# Patient Record
Sex: FEMALE | Race: BLACK OR AFRICAN AMERICAN | Employment: UNEMPLOYED | URBAN - METROPOLITAN AREA
[De-identification: names, ages, dates, MRNs, and addresses within clinical notes are randomized per-mention and may not be internally consistent; named-entity substitution may affect disease eponyms.]

---

## 2017-06-10 ENCOUNTER — APPOINTMENT (OUTPATIENT)
Dept: GENERAL RADIOLOGY | Age: 57
DRG: 281 | End: 2017-06-10
Attending: EMERGENCY MEDICINE
Payer: MEDICARE

## 2017-06-10 ENCOUNTER — HOSPITAL ENCOUNTER (INPATIENT)
Age: 57
LOS: 4 days | Discharge: ANOTHER HOSPITAL | DRG: 281 | End: 2017-06-14
Attending: EMERGENCY MEDICINE | Admitting: INTERNAL MEDICINE
Payer: MEDICARE

## 2017-06-10 DIAGNOSIS — I20.9 ANGINA PECTORIS (HCC): Primary | ICD-10-CM

## 2017-06-10 PROBLEM — I20.8 ANGINA AT REST (HCC): Status: ACTIVE | Noted: 2017-06-10

## 2017-06-10 LAB
ANION GAP BLD CALC-SCNC: 10 MMOL/L (ref 3–18)
APTT PPP: 33.7 SEC (ref 23–36.4)
APTT PPP: 35.6 SEC (ref 23–36.4)
APTT PPP: 68.8 SEC (ref 23–36.4)
APTT PPP: 80 SEC (ref 23–36.4)
BASOPHILS # BLD AUTO: 0 K/UL (ref 0–0.1)
BASOPHILS # BLD: 0 % (ref 0–2)
BUN SERPL-MCNC: 18 MG/DL (ref 7–18)
BUN/CREAT SERPL: 27 (ref 12–20)
CALCIUM SERPL-MCNC: 9 MG/DL (ref 8.5–10.1)
CHLORIDE SERPL-SCNC: 102 MMOL/L (ref 100–108)
CK MB CFR SERPL CALC: 0.8 % (ref 0–4)
CK MB CFR SERPL CALC: 6.2 % (ref 0–4)
CK MB CFR SERPL CALC: 7.1 % (ref 0–4)
CK MB SERPL-MCNC: 2.1 NG/ML (ref 5–25)
CK MB SERPL-MCNC: 23 NG/ML (ref 5–25)
CK MB SERPL-MCNC: 31 NG/ML (ref 5–25)
CK SERPL-CCNC: 262 U/L (ref 26–192)
CK SERPL-CCNC: 371 U/L (ref 26–192)
CK SERPL-CCNC: 438 U/L (ref 26–192)
CO2 SERPL-SCNC: 23 MMOL/L (ref 21–32)
CREAT SERPL-MCNC: 0.66 MG/DL (ref 0.6–1.3)
DIFFERENTIAL METHOD BLD: ABNORMAL
EOSINOPHIL # BLD: 0.1 K/UL (ref 0–0.4)
EOSINOPHIL NFR BLD: 1 % (ref 0–5)
ERYTHROCYTE [DISTWIDTH] IN BLOOD BY AUTOMATED COUNT: 13.4 % (ref 11.6–14.5)
GLUCOSE SERPL-MCNC: 115 MG/DL (ref 74–99)
HCT VFR BLD AUTO: 35.9 % (ref 35–45)
HGB BLD-MCNC: 12.7 G/DL (ref 12–16)
LYMPHOCYTES # BLD AUTO: 26 % (ref 21–52)
LYMPHOCYTES # BLD: 3.1 K/UL (ref 0.9–3.6)
MAGNESIUM SERPL-MCNC: 2.2 MG/DL (ref 1.6–2.6)
MCH RBC QN AUTO: 32.2 PG (ref 24–34)
MCHC RBC AUTO-ENTMCNC: 35.4 G/DL (ref 31–37)
MCV RBC AUTO: 91.1 FL (ref 74–97)
MONOCYTES # BLD: 0.6 K/UL (ref 0.05–1.2)
MONOCYTES NFR BLD AUTO: 5 % (ref 3–10)
NEUTS SEG # BLD: 7.9 K/UL (ref 1.8–8)
NEUTS SEG NFR BLD AUTO: 68 % (ref 40–73)
PLATELET # BLD AUTO: 206 K/UL (ref 135–420)
PMV BLD AUTO: 9.3 FL (ref 9.2–11.8)
POTASSIUM SERPL-SCNC: 3.8 MMOL/L (ref 3.5–5.5)
RBC # BLD AUTO: 3.94 M/UL (ref 4.2–5.3)
SODIUM SERPL-SCNC: 135 MMOL/L (ref 136–145)
TROPONIN I BLD-MCNC: <0.04 NG/ML (ref 0–0.08)
TROPONIN I SERPL-MCNC: 3.75 NG/ML (ref 0–0.04)
TROPONIN I SERPL-MCNC: 7.09 NG/ML (ref 0–0.04)
TROPONIN I SERPL-MCNC: <0.02 NG/ML (ref 0–0.04)
WBC # BLD AUTO: 11.7 K/UL (ref 4.6–13.2)

## 2017-06-10 PROCEDURE — 71010 XR CHEST PORT: CPT

## 2017-06-10 PROCEDURE — 74011250636 HC RX REV CODE- 250/636: Performed by: EMERGENCY MEDICINE

## 2017-06-10 PROCEDURE — 82550 ASSAY OF CK (CPK): CPT | Performed by: EMERGENCY MEDICINE

## 2017-06-10 PROCEDURE — 74011250637 HC RX REV CODE- 250/637: Performed by: INTERNAL MEDICINE

## 2017-06-10 PROCEDURE — 84484 ASSAY OF TROPONIN QUANT: CPT | Performed by: EMERGENCY MEDICINE

## 2017-06-10 PROCEDURE — 74011250637 HC RX REV CODE- 250/637: Performed by: HOSPITALIST

## 2017-06-10 PROCEDURE — 85730 THROMBOPLASTIN TIME PARTIAL: CPT | Performed by: INTERNAL MEDICINE

## 2017-06-10 PROCEDURE — 85025 COMPLETE CBC W/AUTO DIFF WBC: CPT | Performed by: EMERGENCY MEDICINE

## 2017-06-10 PROCEDURE — 36415 COLL VENOUS BLD VENIPUNCTURE: CPT | Performed by: EMERGENCY MEDICINE

## 2017-06-10 PROCEDURE — 80048 BASIC METABOLIC PNL TOTAL CA: CPT | Performed by: EMERGENCY MEDICINE

## 2017-06-10 PROCEDURE — 93005 ELECTROCARDIOGRAM TRACING: CPT

## 2017-06-10 PROCEDURE — 83735 ASSAY OF MAGNESIUM: CPT | Performed by: EMERGENCY MEDICINE

## 2017-06-10 PROCEDURE — 65660000000 HC RM CCU STEPDOWN

## 2017-06-10 PROCEDURE — 99284 EMERGENCY DEPT VISIT MOD MDM: CPT

## 2017-06-10 PROCEDURE — 85730 THROMBOPLASTIN TIME PARTIAL: CPT | Performed by: EMERGENCY MEDICINE

## 2017-06-10 PROCEDURE — 74011000250 HC RX REV CODE- 250: Performed by: EMERGENCY MEDICINE

## 2017-06-10 RX ORDER — METOPROLOL TARTRATE 25 MG/1
25 TABLET, FILM COATED ORAL 2 TIMES DAILY
Status: DISCONTINUED | OUTPATIENT
Start: 2017-06-11 | End: 2017-06-14 | Stop reason: HOSPADM

## 2017-06-10 RX ORDER — THERA TABS 400 MCG
1 TAB ORAL DAILY
Status: DISCONTINUED | OUTPATIENT
Start: 2017-06-11 | End: 2017-06-14 | Stop reason: HOSPADM

## 2017-06-10 RX ORDER — GUAIFENESIN 100 MG/5ML
324 LIQUID (ML) ORAL
Status: DISCONTINUED | OUTPATIENT
Start: 2017-06-10 | End: 2017-06-10

## 2017-06-10 RX ORDER — HEPARIN SODIUM 10000 [USP'U]/100ML
12-25 INJECTION, SOLUTION INTRAVENOUS
Status: DISCONTINUED | OUTPATIENT
Start: 2017-06-10 | End: 2017-06-14 | Stop reason: HOSPADM

## 2017-06-10 RX ORDER — MORPHINE SULFATE 2 MG/ML
2 INJECTION, SOLUTION INTRAMUSCULAR; INTRAVENOUS
Status: DISCONTINUED | OUTPATIENT
Start: 2017-06-10 | End: 2017-06-14 | Stop reason: HOSPADM

## 2017-06-10 RX ORDER — KETOTIFEN FUMARATE 0.35 MG/ML
1 SOLUTION/ DROPS OPHTHALMIC 2 TIMES DAILY
Status: DISCONTINUED | OUTPATIENT
Start: 2017-06-10 | End: 2017-06-14 | Stop reason: HOSPADM

## 2017-06-10 RX ORDER — ATORVASTATIN CALCIUM 20 MG/1
20 TABLET, FILM COATED ORAL
Status: DISCONTINUED | OUTPATIENT
Start: 2017-06-10 | End: 2017-06-14 | Stop reason: HOSPADM

## 2017-06-10 RX ORDER — LISINOPRIL AND HYDROCHLOROTHIAZIDE 20; 25 MG/1; MG/1
1 TABLET ORAL DAILY
Status: DISCONTINUED | OUTPATIENT
Start: 2017-06-10 | End: 2017-06-10 | Stop reason: CLARIF

## 2017-06-10 RX ORDER — METOPROLOL TARTRATE 50 MG/1
50 TABLET ORAL 2 TIMES DAILY
Status: DISCONTINUED | OUTPATIENT
Start: 2017-06-10 | End: 2017-06-10

## 2017-06-10 RX ORDER — PANTOPRAZOLE SODIUM 40 MG/1
40 TABLET, DELAYED RELEASE ORAL
Status: DISCONTINUED | OUTPATIENT
Start: 2017-06-10 | End: 2017-06-14 | Stop reason: HOSPADM

## 2017-06-10 RX ORDER — LISINOPRIL 20 MG/1
20 TABLET ORAL DAILY
Status: DISCONTINUED | OUTPATIENT
Start: 2017-06-10 | End: 2017-06-13

## 2017-06-10 RX ORDER — NITROGLYCERIN 40 MG/100ML
5-20 INJECTION INTRAVENOUS
Status: DISCONTINUED | OUTPATIENT
Start: 2017-06-10 | End: 2017-06-10

## 2017-06-10 RX ORDER — ALBUTEROL SULFATE 0.83 MG/ML
2.5 SOLUTION RESPIRATORY (INHALATION)
Status: DISCONTINUED | OUTPATIENT
Start: 2017-06-10 | End: 2017-06-14 | Stop reason: HOSPADM

## 2017-06-10 RX ORDER — HEPARIN SODIUM 1000 [USP'U]/ML
60 INJECTION, SOLUTION INTRAVENOUS; SUBCUTANEOUS ONCE
Status: COMPLETED | OUTPATIENT
Start: 2017-06-10 | End: 2017-06-10

## 2017-06-10 RX ORDER — CYCLOBENZAPRINE HCL 10 MG
10 TABLET ORAL
Status: DISCONTINUED | OUTPATIENT
Start: 2017-06-10 | End: 2017-06-14 | Stop reason: HOSPADM

## 2017-06-10 RX ORDER — MORPHINE SULFATE 4 MG/ML
4 INJECTION, SOLUTION INTRAMUSCULAR; INTRAVENOUS
Status: DISCONTINUED | OUTPATIENT
Start: 2017-06-10 | End: 2017-06-10

## 2017-06-10 RX ORDER — NITROGLYCERIN 40 MG/100ML
5 INJECTION INTRAVENOUS CONTINUOUS
Status: DISCONTINUED | OUTPATIENT
Start: 2017-06-10 | End: 2017-06-11

## 2017-06-10 RX ORDER — HYDROCHLOROTHIAZIDE 25 MG/1
25 TABLET ORAL DAILY
Status: DISCONTINUED | OUTPATIENT
Start: 2017-06-10 | End: 2017-06-12

## 2017-06-10 RX ORDER — ASPIRIN 325 MG
325 TABLET ORAL DAILY
Status: DISCONTINUED | OUTPATIENT
Start: 2017-06-10 | End: 2017-06-14 | Stop reason: HOSPADM

## 2017-06-10 RX ADMIN — LISINOPRIL 20 MG: 20 TABLET ORAL at 11:13

## 2017-06-10 RX ADMIN — PANTOPRAZOLE SODIUM 40 MG: 40 TABLET, DELAYED RELEASE ORAL at 08:50

## 2017-06-10 RX ADMIN — HEPARIN SODIUM AND DEXTROSE 12 UNITS/KG/HR: 10000; 5 INJECTION INTRAVENOUS at 03:44

## 2017-06-10 RX ADMIN — HYDROCHLOROTHIAZIDE 25 MG: 25 TABLET ORAL at 11:13

## 2017-06-10 RX ADMIN — HEPARIN SODIUM 3260 UNITS: 1000 INJECTION, SOLUTION INTRAVENOUS; SUBCUTANEOUS at 03:38

## 2017-06-10 RX ADMIN — ATORVASTATIN CALCIUM 20 MG: 20 TABLET, FILM COATED ORAL at 22:08

## 2017-06-10 RX ADMIN — METOPROLOL TARTRATE 50 MG: 50 TABLET ORAL at 08:50

## 2017-06-10 RX ADMIN — NITROGLYCERIN 5 MCG/MIN: 40 INJECTION INTRAVENOUS at 04:03

## 2017-06-10 RX ADMIN — ASPIRIN 325 MG ORAL TABLET 325 MG: 325 PILL ORAL at 08:50

## 2017-06-10 RX ADMIN — METOPROLOL TARTRATE 50 MG: 50 TABLET ORAL at 18:40

## 2017-06-10 RX ADMIN — NITROGLYCERIN 5 MCG/MIN: 40 INJECTION INTRAVENOUS at 04:02

## 2017-06-10 RX ADMIN — CYCLOBENZAPRINE HYDROCHLORIDE 10 MG: 10 TABLET, FILM COATED ORAL at 08:51

## 2017-06-10 NOTE — PROGRESS NOTES
Albuterol nebs was therapeutically interchanged for albuterol inhaler per the P&T Committee approved Therapeutic Interchanges Policy.     Garland Montano [facility], Pharmacist  6/10/2017 7:50 AM

## 2017-06-10 NOTE — CONSULTS
[address]    Name:  Blossom Martinez  MR#:  [id number]  :  1960  Account #:  [de-identified]  Date of Adm:  06/10/2017  Date of Consultation:  06/10/2017      CARDIOLOGY CONSULTATION    REASON FOR EVALUATION: Acute coronary syndrome. HISTORY OF PRESENT ILLNESS: The patient is a 44-year-old   patient who came to the hospital with complaint of  sudden onset of chest pain. She has been having on and off  substernal pressure-like sensation on exertion that relieves with rest.  Although last night she had severe chest pain associated with nausea  and vomiting. Pain was not relieved and lasted for almost an hour,  came to the emergency room where she got IV sublingual and  nitroglycerin and subsequently IV heparin. Currently, she is pain-free. PAST HISTORY: Significant for hypertension, hyperlipidemia, mild  mitral regurgitation, history of smoking. SOCIAL HISTORY: History of smoking. No alcohol use. FAMILY HISTORY: Unremarkable. SURGICAL HISTORY: foot surgery. REVIEW OF SYSTEMS  CONSTITUTIONAL: No fever or chills. HEENT: No dizziness, headache, visual disturbance. PULMONARY: Shortness of breath on exertion. GASTROINTESTINAL: History of nausea, vomiting. GENITOURINARY: No dysuria or hematuria. MUSCULOSKELETAL: Denies any edema. NEUROLOGIC: No seizure or syncope. MEDICATIONS PRIOR TO ADMISSION: Included  1. Metoprolol. 2. Zaditor. 3. Flonase. 4. Flexeril. 5. Proventil. 6. Ultram.  7. Prilosec. 8. Zestoretic. 9. Aspirin. ALLERGIES: NONE REPORTED. PHYSICAL EXAMINATION  GENERAL: Alert patient, in no acute distress at present time. VITAL SIGNS: Blood pressure 157/78, pulse 67, respiratory rate 20,  afebrile. HEAD: Normal.  EYES: PERRLA. NECK: No JVD. Carotids are full. LUNGS: Clear. HEART: Sounds normal. No clear gallop or murmur. ABDOMEN: Soft, nontender. EXTREMITIES: No edema. NEUROLOGICAL: Alert.   PSYCHIATRIC: Normal mood and affect. LABORATORY DATA: EKG, I personally reviewed, shows sinus  rhythm with ST depression laterally. Inferior ST-T changes are old. New ST depression in lateral lead compared to 2013. Initial troponin is  negative. White count 11, hemoglobin 12, platelet 262,821. PTT 33. Electrolytes normal, BUN 18, creatinine 0.6. CHEST X-RAY: No acute abnormality. IMPRESSION  1. Chest pain suggestive of acute coronary syndrome with possible  non-Q myocardial infarction. 2. History of smoking. 3. Hypertension, on multiple medications. 4. History of mild mitral regurgitation in the past.    RECOMMENDATION: At the present time, IV heparin, IV nitroglycerin  are continued. Follow serial cardiac enzymes. Considering her history,  will likely require further invasive cardiac workup in form of cardiac  catheterization and possible PCI. Risks, benefits, options of these were  discussed in detail with the patient and she is willing to proceed. I have  also discussed in detail cessation of smoking. Many thanks for allowing us to participate in care of this patient.         MD Ирина Oviedo  D:  06/10/2017   10:06  T:  06/10/2017   10:30  Job #:  [id number]

## 2017-06-10 NOTE — ROUTINE PROCESS
Beside shift report given to Herberth Milan RN(oncoming nurse) including MAR, SBAR and Kardex by Lisha Curiel RN (off going nurse)

## 2017-06-10 NOTE — ED TRIAGE NOTES
Pt arrived via medic complaining of CP x 1 hour ago. Patient is currently alert and oriented at this time.

## 2017-06-10 NOTE — Clinical Note
Status[de-identified] Inpatient [101] Type of Bed: Telemetry [19] Inpatient Hospitalization Certified Necessary for the Following Reasons: 3. Patient receiving treatment that can only be provided in an inpatient setting (further clarification in H&P documentation) Admitting Diagnosis: Angina at rest [facility]) [[id number]] Admitting Physician: Jennifer Rojas Attending Physician: Jennifer Rojas Estimated Length of Stay: > or = to 2 Midnights Discharge Plan[de-identified] Home with Office Follow-up

## 2017-06-10 NOTE — H&P
History and Physical      NAME:  Roseanne Latham   :   1960   MRN:   [id number]     Date/Time:  6/10/2017     CHIEF COMPLAINT: chest pain     HISTORY OF PRESENT ILLNESS:     Ms. Lucio Bee is a 49 y.o.   female with a PMH of HTN, Hyperlipidemia, asthma who presents with c/c of  Chest pain. It is localized at retrosternal area, non radiating, associated with shortness of breathing, but no diaphoresis or palpitation. She also has nausea and vomiting. Chest pain not relieved by SL nitro but with IV morphine per EMS. Initially they thought she has STEMI on the field. ED physician has consulted Dr Kaila Bustos. It was found to be not STEMI. She was started on IV heparin per cardiology recommendation and admitted for further evaluation and management. Patient currently is chest pain free. Past Medical History:   Diagnosis Date    Essential hypertension, benign     Hypercholesterolemia     Hypertension     Other and unspecified hyperlipidemia     Personal history of tobacco use, presenting hazards to health     Discussed cessation    Shortness of breath     Possible asthma Mild MR, no clinical fluid overload        Past Surgical History:   Procedure Laterality Date    HX ORTHOPAEDIC      foot surgery       Social History   Substance Use Topics    Smoking status: Current Every Day Smoker    Smokeless tobacco: Not on file    Alcohol use No        Family History   Problem Relation Age of Onset    Heart Disease Other     Heart Surgery Other     Heart Attack Other         No Known Allergies     Prior to Admission medications    Medication Sig Start Date End Date Taking? Authorizing Provider   metoprolol (LOPRESSOR) 50 mg tablet Take  by mouth two (2) times a day. Historical Provider   ketotifen (ZADITOR) 0.025 % ophthalmic solution Administer 1 Drop to both eyes two (2) times a day. Historical Provider   fluticasone (FLONASE) 50 mcg/actuation nasal spray nightly. Historical Provider   cyclobenzaprine (FLEXERIL) 10 mg tablet Take  by mouth three (3) times daily as needed. Historical Provider   albuterol (PROVENTIL HFA, VENTOLIN HFA) 90 mcg/actuation inhaler Take  by inhalation. Historical Provider   traMADol (ULTRAM) 50 mg tablet Take 50 mg by mouth every six (6) hours as needed. Historical Provider   omeprazole (PRILOSEC) 20 mg capsule Take 20 mg by mouth daily. Historical Provider   lisinopril-hydrochlorothiazide (PRINZIDE, ZESTORETIC) 20-25 mg per tablet Take  by mouth daily. Historical Provider   aspirin delayed-release 325 mg tablet Take  by mouth daily.  2 oral daily    Historical Provider       REVIEW OF SYSTEMS:     CONSTITUTIONAL: No Fever, No chills, No weight loss, No Night sweats  HEENT:  No epistaxis, No diff in swallowing  CVS: No palpitations, No syncope, No peripheral edema, No PND, No orthopnea  RS: No cough, No hemoptysis, No pleuritic chest pain  GI: No abd pain, No vomitting, No diarrhea, No hematemesis, No rectal bleeding, No acid reflux or heartburn  NEURO: No focal weakness, No headaches, No seizures  PSYCH: No anxiety, No depression  MUSCULOSKLETAL: No joint pain or swelling  : No hematuria or dysuria  SKIN: No rash      Physical Exam:    VITALS:    Vital signs reviewed; most recent are:    Visit Vitals    /78 (BP 1 Location: Left arm, BP Patient Position: At rest)    Pulse 67    Temp 98.4 °F (36.9 °C)    Resp 20    Wt 51.3 kg (113 lb)    SpO2 98%    BMI 18.24 kg/m2     SpO2 Readings from Last 6 Encounters:   06/10/17 98%        No intake or output data in the 24 hours ending 06/10/17 0719       GENERAL: Not in acute distress  HEENT: pink conjunctiva, un icteric sclera,   NECK: No lymphadenopthy or thyroid swelling, JVD not seen  LYMPH: No supraclavicular or cervical or axillary nodes on both sides  CVS: S1S2, No murmurs, No gallop or rub  RS: CTA, No wheezing or crackles  Abd: Soft, non tender, not distended, No guarding, No rigidity  NEURO:  No focal neurologic deficits   Extrm: no leg edema or swelling   Skin: No rash      Labs:  Recent Results (from the past 24 hour(s))   EKG, 12 LEAD, INITIAL    Collection Time: 06/10/17  2:23 AM   Result Value Ref Range    Ventricular Rate 85 BPM    Atrial Rate 85 BPM    P-R Interval 174 ms    QRS Duration 84 ms    Q-T Interval 392 ms    QTC Calculation (Bezet) 466 ms    Calculated P Axis 80 degrees    Calculated R Axis 85 degrees    Calculated T Axis 100 degrees    Diagnosis       Age and gender specific ECG analysis  Normal sinus rhythm  Anteroseptal infarct (cited on or before 10-PAPITO-2017)  Inferior injury pattern  ACUTE MI  Consider right ventricular involvement in acute inferior infarct  Abnormal ECG  When compared with ECG of 17-NOV-2011 12:02,  Serial changes of evolving Anteroseptal infarct present     CBC WITH AUTOMATED DIFF    Collection Time: 06/10/17  2:30 AM   Result Value Ref Range    WBC 11.7 4.6 - 13.2 K/uL    RBC 3.94 (L) 4.20 - 5.30 M/uL    HGB 12.7 12.0 - 16.0 g/dL    HCT 35.9 35.0 - 45.0 %    MCV 91.1 74.0 - 97.0 FL    MCH 32.2 24.0 - 34.0 PG    MCHC 35.4 31.0 - 37.0 g/dL    RDW 13.4 11.6 - 14.5 %    PLATELET 445 872 - 102 K/uL    MPV 9.3 9.2 - 11.8 FL    NEUTROPHILS 68 40 - 73 %    LYMPHOCYTES 26 21 - 52 %    MONOCYTES 5 3 - 10 %    EOSINOPHILS 1 0 - 5 %    BASOPHILS 0 0 - 2 %    ABS. NEUTROPHILS 7.9 1.8 - 8.0 K/UL    ABS. LYMPHOCYTES 3.1 0.9 - 3.6 K/UL    ABS. MONOCYTES 0.6 0.05 - 1.2 K/UL    ABS. EOSINOPHILS 0.1 0.0 - 0.4 K/UL    ABS.  BASOPHILS 0.0 0.0 - 0.1 K/UL    DF AUTOMATED     METABOLIC PANEL, BASIC    Collection Time: 06/10/17  2:30 AM   Result Value Ref Range    Sodium 135 (L) 136 - 145 mmol/L    Potassium 3.8 3.5 - 5.5 mmol/L    Chloride 102 100 - 108 mmol/L    CO2 23 21 - 32 mmol/L    Anion gap 10 3.0 - 18 mmol/L    Glucose 115 (H) 74 - 99 mg/dL    BUN 18 7.0 - 18 MG/DL    Creatinine 0.66 0.6 - 1.3 MG/DL    BUN/Creatinine ratio 27 (H) 12 - 20      GFR est AA >60 >60 ml/min/1.73m2    GFR est non-AA >60 >60 ml/min/1.73m2    Calcium 9.0 8.5 - 10.1 MG/DL   MAGNESIUM    Collection Time: 06/10/17  2:30 AM   Result Value Ref Range    Magnesium 2.2 1.6 - 2.6 mg/dL   CARDIAC PANEL,(CK, CKMB & TROPONIN)    Collection Time: 06/10/17  2:30 AM   Result Value Ref Range     (H) 26 - 192 U/L    CK - MB 2.1 <3.6 ng/ml    CK-MB Index 0.8 0.0 - 4.0 %    Troponin-I, Qt. <0.02 0.0 - 0.045 NG/ML   PTT    Collection Time: 06/10/17  2:30 AM   Result Value Ref Range    aPTT 33.7 23.0 - 36.4 SEC   POC TROPONIN-I    Collection Time: 06/10/17  2:35 AM   Result Value Ref Range    Troponin-I (POC) <0.04 0.00 - 0.08 ng/mL         Active Problems:    Angina at rest [facility]) (6/10/2017)        Assessment:       1. Chest pain r/o ACS   2. HTN, controlled  3. Hyperlipidemia,   4. Asthma   5. Tobacco abuse    Plan:       · Admit to telemetry unit  · Serial cardiac enzymes and EKG  · Continue IV heparin  · Continue ASA, statin  · Resume home medications  · Further evaluation per cardiology  · Offered smoking cessation   · Full code        Total time:  65 minutes             _______________________________________________________________________        Attending Physician:  Danya Leon MD

## 2017-06-10 NOTE — LETTER
NOTIFICATION RETURN TO WORK / SCHOOL 
 
6/28/2017 11:43 AM 
 
Ms. Elmer Salazar [address] 
[address] [facility] To Whom It May Concern: 
 
Elmer Salazar is currently under the care of [facility] 2 CV STEPDOWN. She was in the emergency department on 6/10/2017. If there are questions or concerns please have the patient contact our office.  
 
 
 
Sincerely, 
 
 
 
 
Sav Dacosta PA-C

## 2017-06-10 NOTE — ED NOTES
Patient admits to being non-compliant with her medications, per patient, Sindy Flor this past week\" Provider notified.

## 2017-06-10 NOTE — ED NOTES
Patient sitting up in stretcher, no acute distress noted at this time, will continue to monitor and verify medications with provider.

## 2017-06-10 NOTE — PROGRESS NOTES
Abnormal lab note:   Elevated Troponin- 7.09 NG mL. Patient's troponin level has increased to 7.09 from a previous level of 3.75. Cardiologist on call was called ([phone]) and notified of labs as well as low BP 91/59. Patient's metoprolol dose was decreased to 25 mg. Patient denies any chest pain or lightheadedness/dizziness. Patient stable and resting in bed.    Cristina Busch RN

## 2017-06-10 NOTE — ED NOTES
TRANSFER - OUT REPORT:    Verbal report given to Vj RN (name) on Riaz Mason  being [place] [address] [place] [address] [place] of care       Report consisted of patients Situation, Background, Assessment and   Recommendations(SBAR). Information from the following report(s) SBAR, ED Summary and MAR was reviewed with the receiving nurse. Lines:   Peripheral IV 06/10/17 Right Hand (Active)   Site Assessment Clean, dry, & intact 6/10/2017  2:22 AM   Infiltration Assessment 0 6/10/2017  2:22 AM   Dressing Status Clean, dry, & intact 6/10/2017  2:22 AM   Dressing Type Transparent 6/10/2017  2:22 AM   Hub Color/Line Status Patent 6/10/2017  2:22 AM       Peripheral IV 06/10/17 Left Antecubital (Active)   Site Assessment Clean, dry, & intact 6/10/2017  2:40 AM   Phlebitis Assessment 0 6/10/2017  2:40 AM   Infiltration Assessment 0 6/10/2017  2:40 AM   Dressing Status Clean, dry, & intact 6/10/2017  2:40 AM   Dressing Type Transparent 6/10/2017  2:40 AM   Hub Color/Line Status Blue;Patent 6/10/2017  2:40 AM        Opportunity for questions and clarification was provided.       Patient transported with:   Monitor  Registered Nurse

## 2017-06-10 NOTE — ED NOTES
Pt continues to rest comfortably with lights off and no acute distress noted, will continue to monitor.

## 2017-06-10 NOTE — ROUTINE PROCESS
Received adult female from er with complaints of chest pain Patient went to er due to previous mi in January 2017 with failure to follow up as directed by md  Patient states she is still smoking and is +etoh use. Patient is A&Ox4 Heparin and Ntg running.   Patient is able to ambulate by was brought up from er on stretcher  Patient admitted to room call bell within reach

## 2017-06-10 NOTE — ED PROVIDER NOTES
[facility] EMERGENCY DEPT      49 y.o. female with noted past medical history who presents to the emergency department with substernal chest pain that started 1 hours ago, no radiation, with associated nausea and vomiting x 6 times. She could not find her SL NTG and thus called EMS. No relief of CP with 3 SL NTG by EMS, but some relief with 2 mg morphine IV by EMS. Upon arrival in ED, CP is 6/10. No other complaints. Current Facility-Administered Medications   Medication Dose Route Frequency    morphine injection 4 mg  4 mg IntraVENous NOW    heparin (porcine) 1,000 unit/mL injection 3,260 Units  60 Units/kg IntraVENous ONCE    heparin 25,000 units in D5W 250 ml infusion  12-25 Units/kg/hr IntraVENous TITRATE    nitroglycerin (TRIDIL) 400 mcg/ml infusion  5-20 mcg/min IntraVENous TITRATE     Current Outpatient Prescriptions   Medication Sig    metoprolol (LOPRESSOR) 50 mg tablet Take  by mouth two (2) times a day.  ketotifen (ZADITOR) 0.025 % ophthalmic solution Administer 1 Drop to both eyes two (2) times a day.  fluticasone (FLONASE) 50 mcg/actuation nasal spray nightly.  cyclobenzaprine (FLEXERIL) 10 mg tablet Take  by mouth three (3) times daily as needed.  albuterol (PROVENTIL HFA, VENTOLIN HFA) 90 mcg/actuation inhaler Take  by inhalation.  traMADol (ULTRAM) 50 mg tablet Take 50 mg by mouth every six (6) hours as needed.  omeprazole (PRILOSEC) 20 mg capsule Take 20 mg by mouth daily.  lisinopril-hydrochlorothiazide (PRINZIDE, ZESTORETIC) 20-25 mg per tablet Take  by mouth daily.  aspirin delayed-release 325 mg tablet Take  by mouth daily.  2 oral daily       Past Medical History:   Diagnosis Date    Essential hypertension, benign     Hypercholesterolemia     Hypertension     Other and unspecified hyperlipidemia     Personal history of tobacco use, presenting hazards to health     Discussed cessation    Shortness of breath     Possible asthma Mild MR, no clinical fluid overload       Past Surgical History:   Procedure Laterality Date    HX ORTHOPAEDIC      foot surgery       Family History   Problem Relation Age of Onset    Heart Disease Other     Heart Surgery Other     Heart Attack Other        Social History     Social History    Marital status:      Spouse name: N/A    Number of children: N/A    Years of education: N/A     Occupational History    Not on file. Social History Main Topics    Smoking status: Current Every Day Smoker    Smokeless tobacco: Not on file    Alcohol use No    Drug use: Not on file    Sexual activity: Not on file     Other Topics Concern    Not on file     Social History Narrative    No narrative on file       No Known Allergies    Patient's primary care provider (as noted in EPIC):  Maico Haider DO    REVIEW OF SYSTEMS:    Constitutional:  Negative for diaphoresis. HENT:  Negative for congestion. Respiratory:  Negative for cough. Cardiovascular:  Negative for palpitations. Gastrointestinal:  Negative for diarrhea. Genitourinary:  Negative for flank pain. Skin:  Negative for pallor. Neurological:  Negative for weakness. Psychiatric:  Negative for hallucinations. Visit Vitals    BP (!) 143/91    Pulse 81    Temp (P) 97.7 °F (36.5 °C)    Resp 11    Wt 54.4 kg (120 lb)    SpO2 99%    BMI 19.37 kg/m2       PHYSICAL EXAM:    CONSTITUTIONAL:  Alert, in no apparent distress;  well developed;  well nourished. HEAD:  Normocephalic, atraumatic. EYES:  EOMI. Non-icteric sclera. Normal conjunctiva. ENTM:  Nose:  no rhinorrhea. Throat:  no erythema or exudate, mucous membranes moist.  NECK:  No JVD. Supple  RESPIRATORY:  Chest clear, equal breath sounds, good air movement. CARDIOVASCULAR:  Regular rate and rhythm. No murmurs, rubs, or gallops. Chest:  No rash, lesions, bruising. Focal reproducible tenderness to palpation. GI:  Normal bowel sounds, abdomen soft and non-tender. No rebound or guarding. BACK:  Non-tender. UPPER EXT:  Normal inspection. LOWER EXT:  No edema, no calf tenderness. Distal pulses intact. NEURO:  Moves all four extremities, and grossly normal motor exam.  SKIN:  No rashes;  Normal for age. PSYCH:  Alert and normal affect. DIFFERENTIAL DIAGNOSES/ MEDICAL DECISION MAKING:  Chest pain etiologies include acute cardiac events to include possible acute myocardial infarction, acute coronary syndrome, pneumonia, chest wall pain (myofascial/ musculoskeletal etiology), chronic obstructive pulmonary disease (copd), acute asthma exacerbation, congestive heart failure, acute bronchitis, pulmonary embolism, upper respiratory infection, referred abdominal pain, other etiologies, versus combination of the above.     Abnormal lab results from this emergency department encounter:  Labs Reviewed   CBC WITH AUTOMATED DIFF - Abnormal; Notable for the following:        Result Value    RBC 3.94 (*)     All other components within normal limits   METABOLIC PANEL, BASIC - Abnormal; Notable for the following:     Sodium 135 (*)     Glucose 115 (*)     BUN/Creatinine ratio 27 (*)     All other components within normal limits   CARDIAC PANEL,(CK, CKMB & TROPONIN) - Abnormal; Notable for the following:      (*)     All other components within normal limits   MAGNESIUM   PTT   PTT   POC TROPONIN-I       Lab values for this patient within approximately the last 12 hours:  Recent Results (from the past 12 hour(s))   CBC WITH AUTOMATED DIFF    Collection Time: 06/10/17  2:30 AM   Result Value Ref Range    WBC 11.7 4.6 - 13.2 K/uL    RBC 3.94 (L) 4.20 - 5.30 M/uL    HGB 12.7 12.0 - 16.0 g/dL    HCT 35.9 35.0 - 45.0 %    MCV 91.1 74.0 - 97.0 FL    MCH 32.2 24.0 - 34.0 PG    MCHC 35.4 31.0 - 37.0 g/dL    RDW 13.4 11.6 - 14.5 %    PLATELET 936 753 - 379 K/uL    MPV 9.3 9.2 - 11.8 FL    NEUTROPHILS 68 40 - 73 %    LYMPHOCYTES 26 21 - 52 %    MONOCYTES 5 3 - 10 %    EOSINOPHILS 1 0 - 5 % BASOPHILS 0 0 - 2 %    ABS. NEUTROPHILS 7.9 1.8 - 8.0 K/UL    ABS. LYMPHOCYTES 3.1 0.9 - 3.6 K/UL    ABS. MONOCYTES 0.6 0.05 - 1.2 K/UL    ABS. EOSINOPHILS 0.1 0.0 - 0.4 K/UL    ABS. BASOPHILS 0.0 0.0 - 0.1 K/UL    DF AUTOMATED     METABOLIC PANEL, BASIC    Collection Time: 06/10/17  2:30 AM   Result Value Ref Range    Sodium 135 (L) 136 - 145 mmol/L    Potassium 3.8 3.5 - 5.5 mmol/L    Chloride 102 100 - 108 mmol/L    CO2 23 21 - 32 mmol/L    Anion gap 10 3.0 - 18 mmol/L    Glucose 115 (H) 74 - 99 mg/dL    BUN 18 7.0 - 18 MG/DL    Creatinine 0.66 0.6 - 1.3 MG/DL    BUN/Creatinine ratio 27 (H) 12 - 20      GFR est AA >60 >60 ml/min/1.73m2    GFR est non-AA >60 >60 ml/min/1.73m2    Calcium 9.0 8.5 - 10.1 MG/DL   MAGNESIUM    Collection Time: 06/10/17  2:30 AM   Result Value Ref Range    Magnesium 2.2 1.6 - 2.6 mg/dL   CARDIAC PANEL,(CK, CKMB & TROPONIN)    Collection Time: 06/10/17  2:30 AM   Result Value Ref Range     (H) 26 - 192 U/L    CK - MB 2.1 <3.6 ng/ml    CK-MB Index 0.8 0.0 - 4.0 %    Troponin-I, Qt. <0.02 0.0 - 0.045 NG/ML   PTT    Collection Time: 06/10/17  2:30 AM   Result Value Ref Range    aPTT 33.7 23.0 - 36.4 SEC   POC TROPONIN-I    Collection Time: 06/10/17  2:35 AM   Result Value Ref Range    Troponin-I (POC) <0.04 0.00 - 0.08 ng/mL       Radiologist and cardiologist interpretations if available at time of this note:  No results found. Portable (A-P view) CXR:  Preliminary review of x-rays by ED Physician. Interpretation of chest X-ray shows, no infiltrates, no pneumothorax, no CHF, no effusion.     Medication(s) ordered for patient during this emergency visit encounter:  Medications   morphine injection 4 mg (not administered)   heparin (porcine) 1,000 unit/mL injection 3,260 Units (not administered)   heparin 25,000 units in D5W 250 ml infusion (not administered)   nitroglycerin (TRIDIL) 400 mcg/ml infusion (not administered)       Initial EKG interpretation by attending emergency physician:  NSR about 85 bpm with mild ST elevation in inferior leads with no reciprocal changes. This inferior mild ST elevation is seen on prior 2013 EKG as well. ED COURSE:     Consult Cardiology    The on call cardiologist was called and the patient was presented for cardiology consult. I personally spoke with Ruth Gan, in the cardiology group, about the patient's presentation and management. I subsequently placed the noted cardiologist on the treatment team.  Dr. Ruth Gan also compared the present and prior 2013 EKG and does NOT believe that the current EKG meets STEMI criteria. He wants patient admitted, started on heparin and have formal rule out. Admit to Hospitalist    The patient was presented to the accepting hospitalist, Dr. Tigre Rae. The patient's primary doctor is Martina Garcia DO, and admissions for this physician are with the hospitalist.  If the patient has no primary doctor, then admission is to the hospitalist as well. As the emergency physician, I wrote courtesy admission orders for the hospitalist physician. The courtesy orders included explicit instructions for the floor nursing staff to call the admitting attending physician upon patient arrival on the floor. DIAGNOSIS:  1. Angina    Plan: Admit. Randee Cassidy M.D. Provider Attestation:  If a scribe was utilized in generation of this patient record, I personally performed the services described in the documentation, reviewed the documentation, as recorded by the scribe in my presence, and it accurately records the patient's history of presenting illness, review of systems, patient physical examination, and procedures performed by me as the attending physician. Randee Cassidy M.D.   [facility] Board Certified Emergency Physician  6/10/2017.  2:28 AM

## 2017-06-10 NOTE — PROGRESS NOTES
Hospitalist Progress Note    Patient: Elmer Salazar MRN: [id number]  CSN: [id number]    YOB: 1960  Age: 49 y.o. Sex: female    DOA: 6/10/2017 LOS:  LOS: 0 days          Currently chest pain free. Had blood clots in both LE, unsure which medication she was taking. Thought she was on blood thinner, but only takes asa and plavix per home meds. Smokes 10 cigarettes daily, trying to quit, states it's \"hard. \" denies 2nd hand smoke exposure. Was due for colo 2 years ago, last mammo was 2 years ago, does not recall last pap smear. Assessment/Plan     1. Chest pain r/o ACS - asa, heparin gtt, nitro gtt, cath per cards. Tobacco cessation. 2. Hypertension controlled currently  3. Dyslipidemia on statin - check flp  4. Likely COPD BD prn. Tobacco cessation. 5. Tobacco abuse - smoking cessation education. 6. Mild MR  7. Carotid pvls 1/2/17 Mild plaque (<50%) present in the bilateral internal carotid arteries  8. Echo 9/10/16 EF 45%, no PA pressure. HYPOKINESIS OF THE MID APICAL  SEPTAL SEGMENTS, MID ANTEROLATERAL SEGMENT, AND ANTERIOR SEGMENTS. 9. Cardiac cath 9/10/16   - Left main coronary artery: Normal.   - LAD: proximal 40% stenosis. D1 is occluded. - Left circumflex coronary artery: nondominant proximal 40% stenosis. - Right coronary artery: dominant, occluded mid portion with L-R collaterals. ramus prox 30-40% stenosis. - LEFT VENTRICLE: LVEDP- 12. EF- 40%. 10. PVLs LE 2014 negative for dvt  11. Unintentional weight loss - check BMI. Consult nutritionist. She is 2 years overdue for colonoscopy, 1 year overdue for mammogram, and unclear how many years overdue for pap  12. 2014 arterial dopplers: multi level, severe right lower extremity arterial insufficiency. Moderate left lower extremity arterial insufficiency at the level of the femoral-popliteal artery.  Compared to the previous study of 12/03/2013, the right SHEELA has dropped from 0.57 to 0.40, the left SHEELA has dropped from 1.01 to 0.73.  13. dvt prophylaxis  14. Full code. Lives at home by herself. Tele    Additional Notes:      Case discussed with:  [x]Patient  []Family  []Nursing  []Case Management  DVT Prophylaxis:  []Lovenox  []Hep SQ  []SCDs  []Coumadin   [x]On Heparin gtt    Vital signs/Intake and Output:  Visit Vitals    /78 (BP 1 Location: Left arm, BP Patient Position: At rest)    Pulse 67    Temp 98.4 °F (36.9 °C)    Resp 20    Wt 51.3 kg (113 lb)    SpO2 98%    BMI 18.24 kg/m2     Current Shift:     Last three shifts:       Awake alert and oriented x 4. Thin. Appears older than stated age. Ncat. perrl  RRR  Cta b.l  Soft nt nd nabs  No edema. No focal deficit  No rash    Medications Reviewed      Labs: Results:       Chemistry Recent Labs      06/10/17   0230   GLU  115*   NA  135*   K  3.8   CL  102   CO2  23   BUN  18   CREA  0.66   CA  9.0   AGAP  10   BUCR  27*      CBC w/Diff Recent Labs      06/10/17   0230   WBC  11.7   RBC  3.94*   HGB  12.7   HCT  35.9   PLT  206   GRANS  68   LYMPH  26   EOS  1      Cardiac Enzymes Recent Labs      06/10/17   0230   CPK  262*   CKND1  0.8      Coagulation Recent Labs      06/10/17   0230   APTT  33.7       Lipid Panel No results found for: CHOL, CHOLPOCT, CHOLX, CHLST, CHOLV, 235555, HDL, LDL, NLDLCT, DLDL, LDLC, DLDLP, 391488, VLDLC, VLDL, TGLX, TRIGL, TRIGP, TGLPOCT, CHHD, CHHDX   BNP No results for input(s): BNPP in the last 72 hours. Liver Enzymes No results for input(s): TP, ALB, TBIL, AP, SGOT, GPT in the last 72 hours. No lab exists for component: DBIL   Thyroid Studies No results found for: T4, T3U, TSH, TSHEXT     Procedures/imaging: see electronic medical records for all procedures/Xrays and details which were not copied into this note but were reviewed prior to creation of Plan.

## 2017-06-11 LAB
AMPHET UR QL SCN: NEGATIVE
ANION GAP BLD CALC-SCNC: 8 MMOL/L (ref 3–18)
APTT PPP: 106 SEC (ref 23–36.4)
APTT PPP: 119 SEC (ref 23–36.4)
APTT PPP: 80.6 SEC (ref 23–36.4)
BARBITURATES UR QL SCN: NEGATIVE
BASOPHILS # BLD AUTO: 0 K/UL (ref 0–0.06)
BASOPHILS # BLD: 0 % (ref 0–2)
BENZODIAZ UR QL: NEGATIVE
BUN SERPL-MCNC: 24 MG/DL (ref 7–18)
BUN/CREAT SERPL: 34 (ref 12–20)
CALCIUM SERPL-MCNC: 9.2 MG/DL (ref 8.5–10.1)
CANNABINOIDS UR QL SCN: NEGATIVE
CHLORIDE SERPL-SCNC: 105 MMOL/L (ref 100–108)
CHOLEST SERPL-MCNC: 170 MG/DL
CO2 SERPL-SCNC: 26 MMOL/L (ref 21–32)
COCAINE UR QL SCN: NEGATIVE
CREAT SERPL-MCNC: 0.71 MG/DL (ref 0.6–1.3)
DIFFERENTIAL METHOD BLD: ABNORMAL
EOSINOPHIL # BLD: 0.2 K/UL (ref 0–0.4)
EOSINOPHIL NFR BLD: 3 % (ref 0–5)
ERYTHROCYTE [DISTWIDTH] IN BLOOD BY AUTOMATED COUNT: 13.8 % (ref 11.6–14.5)
GLUCOSE SERPL-MCNC: 84 MG/DL (ref 74–99)
HCT VFR BLD AUTO: 33.6 % (ref 35–45)
HDLC SERPL-MCNC: 62 MG/DL (ref 40–60)
HDLC SERPL: 2.7 {RATIO} (ref 0–5)
HDSCOM,HDSCOM: ABNORMAL
HGB BLD-MCNC: 11.5 G/DL (ref 12–16)
LDLC SERPL CALC-MCNC: 97 MG/DL (ref 0–100)
LIPID PROFILE,FLP: ABNORMAL
LYMPHOCYTES # BLD AUTO: 56 % (ref 21–52)
LYMPHOCYTES # BLD: 4.9 K/UL (ref 0.9–3.6)
MAGNESIUM SERPL-MCNC: 2.5 MG/DL (ref 1.6–2.6)
MCH RBC QN AUTO: 31.6 PG (ref 24–34)
MCHC RBC AUTO-ENTMCNC: 34.2 G/DL (ref 31–37)
MCV RBC AUTO: 92.3 FL (ref 74–97)
METHADONE UR QL: NEGATIVE
MONOCYTES # BLD: 0.5 K/UL (ref 0.05–1.2)
MONOCYTES NFR BLD AUTO: 6 % (ref 3–10)
NEUTS SEG # BLD: 3 K/UL (ref 1.8–8)
NEUTS SEG NFR BLD AUTO: 35 % (ref 40–73)
OPIATES UR QL: POSITIVE
PCP UR QL: NEGATIVE
PHOSPHATE SERPL-MCNC: 3.8 MG/DL (ref 2.5–4.9)
PLATELET # BLD AUTO: 193 K/UL (ref 135–420)
PMV BLD AUTO: 9.9 FL (ref 9.2–11.8)
POTASSIUM SERPL-SCNC: 3.6 MMOL/L (ref 3.5–5.5)
RBC # BLD AUTO: 3.64 M/UL (ref 4.2–5.3)
SODIUM SERPL-SCNC: 139 MMOL/L (ref 136–145)
TRIGL SERPL-MCNC: 55 MG/DL (ref ?–150)
VLDLC SERPL CALC-MCNC: 11 MG/DL
WBC # BLD AUTO: 8.6 K/UL (ref 4.6–13.2)

## 2017-06-11 PROCEDURE — 84100 ASSAY OF PHOSPHORUS: CPT | Performed by: INTERNAL MEDICINE

## 2017-06-11 PROCEDURE — 74011250636 HC RX REV CODE- 250/636: Performed by: EMERGENCY MEDICINE

## 2017-06-11 PROCEDURE — 93005 ELECTROCARDIOGRAM TRACING: CPT

## 2017-06-11 PROCEDURE — 65660000000 HC RM CCU STEPDOWN

## 2017-06-11 PROCEDURE — 74011250636 HC RX REV CODE- 250/636: Performed by: INTERNAL MEDICINE

## 2017-06-11 PROCEDURE — 74011250637 HC RX REV CODE- 250/637: Performed by: HOSPITALIST

## 2017-06-11 PROCEDURE — 80061 LIPID PANEL: CPT | Performed by: INTERNAL MEDICINE

## 2017-06-11 PROCEDURE — 85025 COMPLETE CBC W/AUTO DIFF WBC: CPT | Performed by: INTERNAL MEDICINE

## 2017-06-11 PROCEDURE — 80307 DRUG TEST PRSMV CHEM ANLYZR: CPT | Performed by: HOSPITALIST

## 2017-06-11 PROCEDURE — 74011250637 HC RX REV CODE- 250/637: Performed by: INTERNAL MEDICINE

## 2017-06-11 PROCEDURE — 80048 BASIC METABOLIC PNL TOTAL CA: CPT | Performed by: INTERNAL MEDICINE

## 2017-06-11 PROCEDURE — 36415 COLL VENOUS BLD VENIPUNCTURE: CPT | Performed by: INTERNAL MEDICINE

## 2017-06-11 PROCEDURE — 85730 THROMBOPLASTIN TIME PARTIAL: CPT | Performed by: INTERNAL MEDICINE

## 2017-06-11 PROCEDURE — 83735 ASSAY OF MAGNESIUM: CPT | Performed by: INTERNAL MEDICINE

## 2017-06-11 RX ORDER — SODIUM CHLORIDE 9 MG/ML
75 INJECTION, SOLUTION INTRAVENOUS CONTINUOUS
Status: DISCONTINUED | OUTPATIENT
Start: 2017-06-11 | End: 2017-06-12

## 2017-06-11 RX ORDER — SODIUM CHLORIDE 9 MG/ML
150 INJECTION, SOLUTION INTRAVENOUS ONCE
Status: COMPLETED | OUTPATIENT
Start: 2017-06-11 | End: 2017-06-12

## 2017-06-11 RX ORDER — SODIUM CHLORIDE 450 MG/100ML
75 INJECTION, SOLUTION INTRAVENOUS CONTINUOUS
Status: DISCONTINUED | OUTPATIENT
Start: 2017-06-11 | End: 2017-06-11

## 2017-06-11 RX ORDER — DOCUSATE SODIUM 100 MG/1
100 CAPSULE, LIQUID FILLED ORAL 2 TIMES DAILY
Status: DISCONTINUED | OUTPATIENT
Start: 2017-06-11 | End: 2017-06-14 | Stop reason: HOSPADM

## 2017-06-11 RX ADMIN — PANTOPRAZOLE SODIUM 40 MG: 40 TABLET, DELAYED RELEASE ORAL at 08:32

## 2017-06-11 RX ADMIN — CYCLOBENZAPRINE HYDROCHLORIDE 10 MG: 10 TABLET, FILM COATED ORAL at 08:32

## 2017-06-11 RX ADMIN — DOCUSATE SODIUM 100 MG: 100 CAPSULE, LIQUID FILLED ORAL at 18:17

## 2017-06-11 RX ADMIN — ASPIRIN 325 MG ORAL TABLET 325 MG: 325 PILL ORAL at 08:33

## 2017-06-11 RX ADMIN — HEPARIN SODIUM AND DEXTROSE 750 UNITS/HR: 10000; 5 INJECTION INTRAVENOUS at 09:20

## 2017-06-11 RX ADMIN — SODIUM CHLORIDE 75 ML/HR: 900 INJECTION, SOLUTION INTRAVENOUS at 08:37

## 2017-06-11 RX ADMIN — ATORVASTATIN CALCIUM 20 MG: 20 TABLET, FILM COATED ORAL at 22:02

## 2017-06-11 RX ADMIN — SODIUM CHLORIDE 75 ML/HR: 900 INJECTION, SOLUTION INTRAVENOUS at 22:08

## 2017-06-11 RX ADMIN — THERA TABS 1 TABLET: TAB at 08:33

## 2017-06-11 RX ADMIN — SODIUM CHLORIDE 150 ML/HR: 900 INJECTION, SOLUTION INTRAVENOUS at 03:59

## 2017-06-11 NOTE — ROUTINE PROCESS
Bedside report given to BELKYS [facility], on going nurse. Patient quietly resting, reviewed SBAT, kardex and labs.

## 2017-06-11 NOTE — PROGRESS NOTES
Called and spoke to nurse about EKG changes. Nurse informed me that they are monitoring the patient and about to call cardiology.

## 2017-06-11 NOTE — PROGRESS NOTES
[facility]      CARDIOLOGY PROGRESS NOTE  RECS:  Acute non stemi-continue heparin,asa,statin  Low kq-lbkfunhkqtdn-xsez hold beta blockers and start iv fluids  Cardiac cath/pci tomorrow  Risk benefit discussed    ASSESSMENT:  Hospital Problems  Never Reviewed          Codes Class Noted POA    Angina at rest [facility]) ICD-10-CM: I20.8  ICD-9-CM: 413.9  6/10/2017 Unknown                SUBJECTIVE:  none    No CP or SOB  Low bp  OBJECTIVE:    VS:   Visit Vitals    BP 95/59 (BP 1 Location: Left arm, BP Patient Position: At rest)    Pulse (!) 53    Temp 97.5 °F (36.4 °C)    Resp 16    Wt 51.8 kg (114 lb 4.8 oz)    SpO2 97%    BMI 18.45 kg/m2         Intake/Output Summary (Last 24 hours) at 06/11/17 0826  Last data filed at 06/10/17 1737   Gross per 24 hour   Intake              240 ml   Output                0 ml   Net              240 ml     TELE: dhaval quiroz    General: alert and in no apparent distress  HENT: Normocephalic, atraumatic. Normal external eye.   Neck :  no JVD  Cardiac:  S1, S2 normal  Lungs: clear to auscultation bilaterally  Abdomen: Soft, nontender, no masses  Extremities:  No c/c/e, peripheral pulses present  I have personally reviewed patients ekg done       Labs: Results:       Chemistry Recent Labs      06/11/17   0406  06/10/17   0230   GLU  84  115*   NA  139  135*   K  3.6  3.8   CL  105  102   CO2  26  23   BUN  24*  18   CREA  0.71  0.66   CA  9.2  9.0   AGAP  8  10   BUCR  34*  27*      CBC w/Diff Recent Labs      06/11/17   0406  06/10/17   0230   WBC  8.6  11.7   RBC  3.64*  3.94*   HGB  11.5*  12.7   HCT  33.6*  35.9   PLT  193  206   GRANS  35*  68   LYMPH  56*  26   EOS  3  1      Cardiac Enzymes Recent Labs      06/10/17   1600  06/10/17   0957   CPK  438*  371*   CKND1  7.1*  6.2*      Coagulation Recent Labs      06/11/17   0406  06/10/17   2220   APTT  106.0*  35.6       Lipid Panel Lab Results   Component Value Date/Time    Cholesterol, total 170 06/11/2017 04:06 AM    HDL Cholesterol 62 06/11/2017 04:06 AM    LDL, calculated 97 06/11/2017 04:06 AM    VLDL, calculated 11 06/11/2017 04:06 AM    Triglyceride 55 06/11/2017 04:06 AM    CHOL/HDL Ratio 2.7 06/11/2017 04:06 AM      BNP No results for input(s): BNPP in the last 72 hours. Liver Enzymes No results for input(s): TP, ALB, TBIL, AP, SGOT, GPT in the last 72 hours.     No lab exists for component: DBIL   Thyroid Studies No results found for: T4, T3U, TSH, TSHEXT           Yousif Curtis MD   Pager # [phone]

## 2017-06-11 NOTE — PROGRESS NOTES
Abnormal EKG/Low HR Note:  Dr. Bhavani Estrada called and notified about EKG changes as well as low HR (40's-50's). Pt denies chest pain but said she was lightheaded. SBP 95. All BP meds to be held this morning and 1/2 NS continuous fluids to be started- verbal ordered by Dr. Bhavani Estrada. Pt stable.    Guillermina Collazo RN

## 2017-06-11 NOTE — PROGRESS NOTES
Hospitalist Progress Note    Patient: Giovani Bernard MRN: [id number]  CSN: [id number]    YOB: 1960  Age: 49 y.o. Sex: female    DOA: 6/10/2017 LOS:  LOS: 1 day          BB held and started on iv fluids per cardiology. Denies chest pain. Last BM was Thursday. Denies sob. Assessment/Plan     1. Chest pain r/o ACS - asa, heparin gtt, nitro gtt, cath per cards. Tobacco cessation. 2. Hypertension by hx - had low bp, BB held. 3. Dyslipidemia on statin  4. Likely COPD BD prn. Tobacco cessation. 5. Tobacco abuse - smoking cessation education. 6. Mild MR  7. Carotid pvls 1/2/17 Mild plaque (<50%) present in the bilateral internal carotid arteries  8. Echo 9/10/16 EF 45%, no PA pressure. HYPOKINESIS OF THE MID APICAL  SEPTAL SEGMENTS, MID ANTEROLATERAL SEGMENT, AND ANTERIOR SEGMENTS. 9. Cardiac cath 9/10/16   - Left main coronary artery: Normal.   - LAD: proximal 40% stenosis. D1 is occluded. - Left circumflex coronary artery: nondominant proximal 40% stenosis. - Right coronary artery: dominant, occluded mid portion with L-R collaterals. ramus prox 30-40% stenosis. - LEFT VENTRICLE: LVEDP- 12. EF- 40%. 10. PVLs LE 2014 negative for dvt  11. Unintentional weight loss - on multivit. Consult nutritionist. She is 2 years overdue for colonoscopy, 1 year overdue for mammogram, and unclear how many years overdue for pap  12. underweight Body mass index is 18.45 kg/(m^2). see #11. 13. 2014 arterial dopplers: multi level, severe right lower extremity arterial insufficiency. Moderate left lower extremity arterial insufficiency at the level of the femoral-popliteal artery. Compared to the previous study of 12/03/2013, the right SHEELA has dropped from 0.57 to 0.40, the left SHEELA has dropped from 1.01 to 0.73.  13. dvt prophylaxis  14. Full code. Lives at home by herself.  Tele    Additional Notes:      Case discussed with:  [x]Patient  []Family  [x]Nursing  []Case Management  DVT Prophylaxis: []Lovenox  []Hep SQ  []SCDs  []Coumadin   [x]On Heparin gtt    Vital signs/Intake and Output:  Visit Vitals    /58 (BP 1 Location: Left arm, BP Patient Position: At rest)    Pulse 87    Temp 98.5 °F (36.9 °C)    Resp 20    Wt 51.8 kg (114 lb 4.8 oz)    SpO2 99%    BMI 18.45 kg/m2     Current Shift:  06/11 0701 - 06/11 1900  In: 120 [P.O.:120]  Out: -   Last three shifts:  06/09 1901 - 06/11 0700  In: 624.3 [P.O.:600; I.V.:24.3]  Out: 500 [Urine:500]    Awake alert and oriented x 4. Thin. Appears older than stated age. Ncat. perrl  RRR  Cta b.l  Soft nt nd nabs  No edema. No focal deficit  No rash    Medications Reviewed      Labs: Results:       Chemistry Recent Labs      06/11/17   0406  06/10/17   0230   GLU  84  115*   NA  139  135*   K  3.6  3.8   CL  105  102   CO2  26  23   BUN  24*  18   CREA  0.71  0.66   CA  9.2  9.0   AGAP  8  10   BUCR  34*  27*      CBC w/Diff Recent Labs      06/11/17   0406  06/10/17   0230   WBC  8.6  11.7   RBC  3.64*  3.94*   HGB  11.5*  12.7   HCT  33.6*  35.9   PLT  193  206   GRANS  35*  68   LYMPH  56*  26   EOS  3  1      Cardiac Enzymes Recent Labs      06/10/17   1600  06/10/17   0957   CPK  438*  371*   CKND1  7.1*  6.2*      Coagulation Recent Labs      06/11/17   0913  06/11/17   0406   APTT  119.0*  106.0*       Lipid Panel Lab Results   Component Value Date/Time    Cholesterol, total 170 06/11/2017 04:06 AM    HDL Cholesterol 62 06/11/2017 04:06 AM    LDL, calculated 97 06/11/2017 04:06 AM    VLDL, calculated 11 06/11/2017 04:06 AM    Triglyceride 55 06/11/2017 04:06 AM    CHOL/HDL Ratio 2.7 06/11/2017 04:06 AM      BNP No results for input(s): BNPP in the last 72 hours. Liver Enzymes No results for input(s): TP, ALB, TBIL, AP, SGOT, GPT in the last 72 hours.     No lab exists for component: DBIL   Thyroid Studies No results found for: T4, T3U, TSH, TSHEXT, TSHEXT     Procedures/imaging: see electronic medical records for all procedures/Xrays and details which were not copied into this note but were reviewed prior to creation of Plan.

## 2017-06-11 NOTE — PROGRESS NOTES
Patient VS checked, HR at 48, awake and VS recorded with B/P 85/52, manually checked 80/40. Nitro drip stopped. Patient states she's lightheaded upon getting up, encouraged to call for assistance. Dr Vish Melendez notified and orders given. Bolus  ml and D/C Nitro drip.

## 2017-06-12 LAB
ACT BLD: 162 SECS (ref 79–138)
ANION GAP BLD CALC-SCNC: 5 MMOL/L (ref 3–18)
APTT PPP: 65.5 SEC (ref 23–36.4)
APTT PPP: 77.9 SEC (ref 23–36.4)
APTT PPP: 80 SEC (ref 23–36.4)
ATRIAL RATE: 48 BPM
ATRIAL RATE: 59 BPM
ATRIAL RATE: 62 BPM
ATRIAL RATE: 85 BPM
BASOPHILS # BLD AUTO: 0 K/UL (ref 0–0.1)
BASOPHILS # BLD: 0 % (ref 0–2)
BUN SERPL-MCNC: 20 MG/DL (ref 7–18)
BUN/CREAT SERPL: 29 (ref 12–20)
CALCIUM SERPL-MCNC: 8.5 MG/DL (ref 8.5–10.1)
CALCULATED P AXIS, ECG09: 38 DEGREES
CALCULATED P AXIS, ECG09: 60 DEGREES
CALCULATED P AXIS, ECG09: 64 DEGREES
CALCULATED P AXIS, ECG09: 80 DEGREES
CALCULATED R AXIS, ECG10: 78 DEGREES
CALCULATED R AXIS, ECG10: 78 DEGREES
CALCULATED R AXIS, ECG10: 85 DEGREES
CALCULATED R AXIS, ECG10: 87 DEGREES
CALCULATED T AXIS, ECG11: 100 DEGREES
CALCULATED T AXIS, ECG11: 107 DEGREES
CALCULATED T AXIS, ECG11: 128 DEGREES
CALCULATED T AXIS, ECG11: 131 DEGREES
CHLORIDE SERPL-SCNC: 107 MMOL/L (ref 100–108)
CO2 SERPL-SCNC: 28 MMOL/L (ref 21–32)
CREAT SERPL-MCNC: 0.69 MG/DL (ref 0.6–1.3)
DIAGNOSIS, 93000: NORMAL
DIFFERENTIAL METHOD BLD: ABNORMAL
EOSINOPHIL # BLD: 0.2 K/UL (ref 0–0.4)
EOSINOPHIL NFR BLD: 2 % (ref 0–5)
ERYTHROCYTE [DISTWIDTH] IN BLOOD BY AUTOMATED COUNT: 13.6 % (ref 11.6–14.5)
GLUCOSE SERPL-MCNC: 99 MG/DL (ref 74–99)
HCT VFR BLD AUTO: 31 % (ref 35–45)
HGB BLD-MCNC: 10.4 G/DL (ref 12–16)
INR PPP: 1 (ref 0.8–1.2)
LYMPHOCYTES # BLD AUTO: 58 % (ref 21–52)
LYMPHOCYTES # BLD: 4.4 K/UL (ref 0.9–3.6)
MAGNESIUM SERPL-MCNC: 2.2 MG/DL (ref 1.6–2.6)
MCH RBC QN AUTO: 31 PG (ref 24–34)
MCHC RBC AUTO-ENTMCNC: 33.5 G/DL (ref 31–37)
MCV RBC AUTO: 92.3 FL (ref 74–97)
MONOCYTES # BLD: 0.6 K/UL (ref 0.05–1.2)
MONOCYTES NFR BLD AUTO: 7 % (ref 3–10)
NEUTS SEG # BLD: 2.5 K/UL (ref 1.8–8)
NEUTS SEG NFR BLD AUTO: 33 % (ref 40–73)
P-R INTERVAL, ECG05: 158 MS
P-R INTERVAL, ECG05: 174 MS
P-R INTERVAL, ECG05: 188 MS
P-R INTERVAL, ECG05: 198 MS
PLATELET # BLD AUTO: 187 K/UL (ref 135–420)
PMV BLD AUTO: 9.7 FL (ref 9.2–11.8)
POTASSIUM SERPL-SCNC: 3.5 MMOL/L (ref 3.5–5.5)
PROTHROMBIN TIME: 12.6 SEC (ref 11.5–15.2)
Q-T INTERVAL, ECG07: 392 MS
Q-T INTERVAL, ECG07: 492 MS
Q-T INTERVAL, ECG07: 554 MS
Q-T INTERVAL, ECG07: 646 MS
QRS DURATION, ECG06: 84 MS
QRS DURATION, ECG06: 88 MS
QRS DURATION, ECG06: 90 MS
QRS DURATION, ECG06: 90 MS
QTC CALCULATION (BEZET), ECG08: 466 MS
QTC CALCULATION (BEZET), ECG08: 499 MS
QTC CALCULATION (BEZET), ECG08: 548 MS
QTC CALCULATION (BEZET), ECG08: 577 MS
RBC # BLD AUTO: 3.36 M/UL (ref 4.2–5.3)
SODIUM SERPL-SCNC: 140 MMOL/L (ref 136–145)
VENTRICULAR RATE, ECG03: 48 BPM
VENTRICULAR RATE, ECG03: 59 BPM
VENTRICULAR RATE, ECG03: 62 BPM
VENTRICULAR RATE, ECG03: 85 BPM
WBC # BLD AUTO: 7.6 K/UL (ref 4.6–13.2)

## 2017-06-12 PROCEDURE — 85730 THROMBOPLASTIN TIME PARTIAL: CPT | Performed by: HOSPITALIST

## 2017-06-12 PROCEDURE — 74011000250 HC RX REV CODE- 250: Performed by: INTERNAL MEDICINE

## 2017-06-12 PROCEDURE — 74011250637 HC RX REV CODE- 250/637: Performed by: HOSPITALIST

## 2017-06-12 PROCEDURE — 85610 PROTHROMBIN TIME: CPT | Performed by: HOSPITALIST

## 2017-06-12 PROCEDURE — 93005 ELECTROCARDIOGRAM TRACING: CPT

## 2017-06-12 PROCEDURE — 77030013797 CARDIAC CATHETERIZATION

## 2017-06-12 PROCEDURE — 74011250637 HC RX REV CODE- 250/637: Performed by: INTERNAL MEDICINE

## 2017-06-12 PROCEDURE — 36415 COLL VENOUS BLD VENIPUNCTURE: CPT | Performed by: INTERNAL MEDICINE

## 2017-06-12 PROCEDURE — B2151ZZ FLUOROSCOPY OF LEFT HEART USING LOW OSMOLAR CONTRAST: ICD-10-PCS | Performed by: INTERNAL MEDICINE

## 2017-06-12 PROCEDURE — 74011250636 HC RX REV CODE- 250/636: Performed by: INTERNAL MEDICINE

## 2017-06-12 PROCEDURE — 74011250637 HC RX REV CODE- 250/637: Performed by: NURSE PRACTITIONER

## 2017-06-12 PROCEDURE — 85025 COMPLETE CBC W/AUTO DIFF WBC: CPT | Performed by: HOSPITALIST

## 2017-06-12 PROCEDURE — 83735 ASSAY OF MAGNESIUM: CPT | Performed by: HOSPITALIST

## 2017-06-12 PROCEDURE — 4A023N7 MEASUREMENT OF CARDIAC SAMPLING AND PRESSURE, LEFT HEART, PERCUTANEOUS APPROACH: ICD-10-PCS | Performed by: INTERNAL MEDICINE

## 2017-06-12 PROCEDURE — 85347 COAGULATION TIME ACTIVATED: CPT

## 2017-06-12 PROCEDURE — 80048 BASIC METABOLIC PNL TOTAL CA: CPT | Performed by: HOSPITALIST

## 2017-06-12 PROCEDURE — 85730 THROMBOPLASTIN TIME PARTIAL: CPT | Performed by: INTERNAL MEDICINE

## 2017-06-12 PROCEDURE — 65660000004 HC RM CVT STEPDOWN

## 2017-06-12 PROCEDURE — 74011636320 HC RX REV CODE- 636/320: Performed by: INTERNAL MEDICINE

## 2017-06-12 PROCEDURE — B2111ZZ FLUOROSCOPY OF MULTIPLE CORONARY ARTERIES USING LOW OSMOLAR CONTRAST: ICD-10-PCS | Performed by: INTERNAL MEDICINE

## 2017-06-12 RX ORDER — HEPARIN SODIUM 200 [USP'U]/100ML
500 INJECTION, SOLUTION INTRAVENOUS ONCE
Status: COMPLETED | OUTPATIENT
Start: 2017-06-12 | End: 2017-06-12

## 2017-06-12 RX ORDER — BIVALIRUDIN 250 MG/5ML
0.75 INJECTION, POWDER, LYOPHILIZED, FOR SOLUTION INTRAVENOUS ONCE
Status: DISCONTINUED | OUTPATIENT
Start: 2017-06-12 | End: 2017-06-12

## 2017-06-12 RX ORDER — SODIUM CHLORIDE 0.9 % (FLUSH) 0.9 %
5-10 SYRINGE (ML) INJECTION AS NEEDED
Status: DISCONTINUED | OUTPATIENT
Start: 2017-06-12 | End: 2017-06-14 | Stop reason: HOSPADM

## 2017-06-12 RX ORDER — SODIUM CHLORIDE 0.9 % (FLUSH) 0.9 %
5-10 SYRINGE (ML) INJECTION EVERY 8 HOURS
Status: DISCONTINUED | OUTPATIENT
Start: 2017-06-12 | End: 2017-06-14 | Stop reason: HOSPADM

## 2017-06-12 RX ORDER — MIDAZOLAM HYDROCHLORIDE 1 MG/ML
.5-2 INJECTION, SOLUTION INTRAMUSCULAR; INTRAVENOUS
Status: DISCONTINUED | OUTPATIENT
Start: 2017-06-12 | End: 2017-06-12

## 2017-06-12 RX ORDER — VERAPAMIL HYDROCHLORIDE 2.5 MG/ML
2.5-5 INJECTION, SOLUTION INTRAVENOUS ONCE
Status: COMPLETED | OUTPATIENT
Start: 2017-06-12 | End: 2017-06-12

## 2017-06-12 RX ORDER — FENTANYL CITRATE 50 UG/ML
12.5-1 INJECTION, SOLUTION INTRAMUSCULAR; INTRAVENOUS
Status: DISCONTINUED | OUTPATIENT
Start: 2017-06-12 | End: 2017-06-12

## 2017-06-12 RX ORDER — HEPARIN SODIUM 1000 [USP'U]/ML
1000-10000 INJECTION, SOLUTION INTRAVENOUS; SUBCUTANEOUS
Status: DISCONTINUED | OUTPATIENT
Start: 2017-06-12 | End: 2017-06-12

## 2017-06-12 RX ORDER — LIDOCAINE HYDROCHLORIDE 10 MG/ML
1-30 INJECTION, SOLUTION EPIDURAL; INFILTRATION; INTRACAUDAL; PERINEURAL
Status: DISCONTINUED | OUTPATIENT
Start: 2017-06-12 | End: 2017-06-12

## 2017-06-12 RX ADMIN — DOCUSATE SODIUM 100 MG: 100 CAPSULE, LIQUID FILLED ORAL at 17:47

## 2017-06-12 RX ADMIN — METOPROLOL TARTRATE 25 MG: 25 TABLET ORAL at 14:55

## 2017-06-12 RX ADMIN — METOPROLOL TARTRATE 25 MG: 25 TABLET ORAL at 00:43

## 2017-06-12 RX ADMIN — VERAPAMIL HYDROCHLORIDE 5 MG: 2.5 INJECTION INTRAVENOUS at 10:18

## 2017-06-12 RX ADMIN — MIDAZOLAM HYDROCHLORIDE 1 MG: 1 INJECTION, SOLUTION INTRAMUSCULAR; INTRAVENOUS at 10:06

## 2017-06-12 RX ADMIN — Medication 10 ML: at 15:11

## 2017-06-12 RX ADMIN — LIDOCAINE HYDROCHLORIDE 6 ML: 10 INJECTION, SOLUTION EPIDURAL; INFILTRATION; INTRACAUDAL; PERINEURAL at 10:11

## 2017-06-12 RX ADMIN — FENTANYL CITRATE 25 MCG: 50 INJECTION INTRAMUSCULAR; INTRAVENOUS at 10:12

## 2017-06-12 RX ADMIN — ASPIRIN 325 MG ORAL TABLET 325 MG: 325 PILL ORAL at 08:43

## 2017-06-12 RX ADMIN — HEPARIN SODIUM 1000 UNITS: 200 INJECTION, SOLUTION INTRAVENOUS at 10:19

## 2017-06-12 RX ADMIN — NITROGLYCERIN 200 MCG: 5 INJECTION, SOLUTION INTRAVENOUS at 10:20

## 2017-06-12 RX ADMIN — MIDAZOLAM HYDROCHLORIDE 1 MG: 1 INJECTION, SOLUTION INTRAMUSCULAR; INTRAVENOUS at 10:12

## 2017-06-12 RX ADMIN — CYCLOBENZAPRINE HYDROCHLORIDE 10 MG: 10 TABLET, FILM COATED ORAL at 22:24

## 2017-06-12 RX ADMIN — ATORVASTATIN CALCIUM 20 MG: 20 TABLET, FILM COATED ORAL at 22:23

## 2017-06-12 RX ADMIN — FENTANYL CITRATE 25 MCG: 50 INJECTION INTRAMUSCULAR; INTRAVENOUS at 10:04

## 2017-06-12 RX ADMIN — SODIUM CHLORIDE 75 ML/HR: 900 INJECTION, SOLUTION INTRAVENOUS at 13:58

## 2017-06-12 RX ADMIN — Medication 10 ML: at 22:26

## 2017-06-12 RX ADMIN — IOPAMIDOL 85 ML: 612 INJECTION, SOLUTION INTRAVENOUS at 10:27

## 2017-06-12 RX ADMIN — NITROGLYCERIN 200 MCG: 5 INJECTION, SOLUTION INTRAVENOUS at 10:18

## 2017-06-12 NOTE — ROUTINE PROCESS
Bedside and Verbal shift change report given to Nessa Lyons (oncoming nurse) by Kev Chand RN (offgoing nurse).  Report included the following information SBAR, Kardex, Procedure Summary, MAR, Recent Results, Med Rec Status and Cardiac Rhythm SB.

## 2017-06-12 NOTE — PROCEDURES
CARDIAC CATHETERIZATION LABORATORY PROCEDURE REPORT    Hazel Espino is a 49 y.o. female    Medical Record Number: [id number]    Primary Care Provider: Karthikeyan Aguila DO      Referral Provider: No ref. provider found    PROCEDURE DATE: 6/12/2017    INDICATIONS:   Non-STEMI with persistent chest pains. MD PERFORMING PROCEDURE: [id number] Gulshan Prince MD    PROCEDURE:  Left heart catheterization, coronary angiography and left ventriculography. ANESTHESIA: Moderate sedation and local anesthesia. EBL: 10-50 ml  CONTRAST USE: Approximately 85 ml  RADIATION: 553 mGy    Risks, benefits, and alternatives to the procedure were explained to the patient prior to the procedure. Questions were answered in detail. The patient appeared to understand and appropriate informed consent was obtained. The patient was brought to the cardiac catheterization  laboratory in a post-absorptive state and right wrist was prepped and draped in the usual sterile manner. Moderate sedation was achieved with a combination of Versed (midazolam) and Fentanyl. Lidocaine was used to secure local anesthesia. The right radial artery was cannulated using a Micro-puncture kit and a 6 [place] sheath was inserted. The patient received 3000 units of IV Heparin bolus as well as 5 mg Verapamil and 200 microgram NTG through the sheath to prevent arterial vasospasm. 6 [place] catheter was used to obtain selective bilateral coronary angiograms, under fluoroscopic guidance,  in multiple projections. LV angiography was performed in the YUSUF projection with a hand injection. Pressures were recorded in the LV and during pull back across the aortic valve. The following were observed. FLUOROSCOPY: There was mild significant calcification. HEMODYNAMIC / ANGIOGRAPHIC DATA  · Left ventricle: End diastolic pressure was 16 mmHg. Left ventriculography: The EF was estimated to be around 60 %.  There was small, distal lateral hypokinetic diagnostic segmental wall motion abnormality. · Aorta: There was no significant systolic pressure gradient across the aortic valve. · Mitral valve: There was no mitral regurgitation. · Coronary Angiography:  · The coronary circulation was right dominant. · Left main: Angiographically normal.   · Left anterior descending: Long ostial 50-60%. · Ramus Intermediate: Angiographically normal.  · Diagonal Branches: Proximal/mid 95%, likely culprit. · Circumflex: Ostial 50% with proximal focal 70%. · Obtuse Marginal Branches: Focal ostial 80% with mid segment 30%. · Right coronary: Mid 100% occlusion with distal filling via collaterals from the left coronary system. The patient was transferred to the observation area with stable vital signs and in an asymptomatic state. The sheath will be pulled and hemostasis will be secured with the application of pressure. There was no apparent immediate complication. SUMMARY: Severe three-vessel CAD with excellent targets, and overall normal LV function. Would recommend consideration for coronary artery bypass graft surgery. Moderate sedation was utilized for 30 minutes using Versed and fentanyl under my supervision. RECOMMENDATIONS:  Post-procedure care will focus on aggressive risk factor modification.      Electronically signed: Kylie Mcmillan MD, [facility]

## 2017-06-12 NOTE — PROGRESS NOTES
NUTRITION    Nutrition Consult: General Nutrition Management & Supplements     RECOMMENDATIONS / PLAN:     - Resume diet as medically appropriate.   - Continue RD inpatient monitoring and evaluation. NUTRITION INTERVENTIONS & DIAGNOSIS:     [] Meals/Snacks: modified diet  [] Medical food supplementation     Nutrition Diagnosis: No nutrition diagnosis at this time. ASSESSMENT:     NPO for procedure. Average po intake adequate to meet patients estimated nutritional needs:   [] Yes     [x] No   [] Unable to determine at this time    Diet: DIET NPO      Food Allergies: NKFA  Current Appetite:   [] Good     [] Fair     [] Poor     [] Other:  Appetite/meal intake prior to admission:   [] Good     [] Fair     [] Poor     [] Other:  Feeding Limitations:  [] Swallowing difficulty    [] Chewing difficulty    [] Other:  Current Meal Intake: Patient Vitals for the past 100 hrs:   % Diet Eaten   06/11/17 1850 25 %   06/11/17 0927 25 %   06/10/17 1405 100 %   06/10/17 0810 100 %     BM: 6/10   Skin Integrity: WDL  Edema: none   Pertinent Medications: Reviewed    Recent Labs      06/12/17   0127  06/11/17   0406  06/10/17   0230   NA  140  139  135*   K  3.5  3.6  3.8   CL  107  105  102   CO2  28  26  23   GLU  99  84  115*   BUN  20*  24*  18   CREA  0.69  0.71  0.66   CA  8.5  9.2  9.0   MG  2.2  2.5  2.2   PHOS   --   3.8   --        Intake/Output Summary (Last 24 hours) at 06/12/17 1404  Last data filed at 06/12/17 1024   Gross per 24 hour   Intake          2431.04 ml   Output              700 ml   Net          1731.04 ml       Anthropometrics:  Ht Readings from Last 1 Encounters:   06/12/17 5' 6\" (1.676 m)     Last 3 Recorded Weights in this Encounter    06/11/17 0402 06/12/17 0647 06/12/17 0900   Weight: 51.8 kg (114 lb 4.8 oz) 54.1 kg (119 lb 4.8 oz) 54 kg (119 lb)     Body mass index is 19.21 kg/(m^2).       Borderline Underweight     Weight History: 5 lb, 4% weight loss per chart     Weight Metrics 6/12/2017 5/31/2013 4/1/2013   Weight 119 lb 123 lb 124 lb   BMI 19.21 kg/m2 19.86 kg/m2 20.02 kg/m2        Admitting Diagnosis: Angina at rest [facility])  Angina at rest [facility])  Past Medical History:   Diagnosis Date    Essential hypertension, benign     Hypercholesterolemia     Hypertension     Other and unspecified hyperlipidemia     Personal history of tobacco use, presenting hazards to health     Discussed cessation    Shortness of breath     Possible asthma Mild MR, no clinical fluid overload       Education Needs:        [x] None identified  [] Identified - Not appropriate at this time  []  Identified and addressed - refer to education log  Learning Limitations:   [x] None identified  [] Identified    Cultural, Pentecostal & ethnic food preferences:  [x] None identified    [] Identified and addressed     ESTIMATED NUTRITION NEEDS:     Calories: 2688-9443 kcal (MSJx1.2-1.3) based on  [x] Actual BW 54 kg     [] IBW   Protein: 43-54 gm (0.8-1 gm/kg) based on  [x] Actual BW      [] IBW   Fluid: 1 mL/kcal     MONITORING & EVALUATION:     Nutrition Goal(s):   1. Po intake of meals will meet >75% of patient estimated nutritional needs within the next 7 days.   Outcome:  [] Met/Ongoing    []  Not Met    [x] New/Initial Goal     Monitoring:   [x] Diet tolerance   [x] Meal intake   [] Supplement intake   [] GI symptoms/ability to tolerate po diet   [] Respiratory status   [] Plan of care      Previous Recommendations (for follow-up assessments only):     []   Implemented       []   Not Implemented (RD to address)     [] No Recommendation Made     Discharge Planning: cardiac diet   [x] Participated in care planning, discharge planning, & interdisciplinary rounds as appropriate      Marycruz Ward, [address], [address] [place] [address]    Pager: [phone]

## 2017-06-12 NOTE — PROGRESS NOTES
[facility]      CARDIOLOGY PROGRESS NOTE  RECS:  Acute non stemi-continue heparin,asa,statin  Low fr-xroikcavklkc-irmh hold beta blockers and start iv fluids  Cardiac cath-multivessel disease  Will ask for surgical opinion      ASSESSMENT:  Hospital Problems  Never Reviewed          Codes Class Noted POA    Angina at rest [facility]) ICD-10-CM: I20.8  ICD-9-CM: 413.9  6/10/2017 Unknown                SUBJECTIVE:  none    No CP or SOB    OBJECTIVE:    VS:   Visit Vitals    /76 (BP 1 Location: Left arm, BP Patient Position: At rest)    Pulse (!) 54    Temp 98.5 °F (36.9 °C)    Resp 18    Ht 5' 6\" (1.676 m)    Wt 54 kg (119 lb)    SpO2 97%    BMI 19.21 kg/m2         Intake/Output Summary (Last 24 hours) at 06/12/17 1050  Last data filed at 06/12/17 1024   Gross per 24 hour   Intake          2431.04 ml   Output              700 ml   Net          1731.04 ml     TELE: dhaval quiroz    General: alert and in no apparent distress  HENT: Normocephalic, atraumatic. Normal external eye.   Neck :  no JVD  Cardiac:  S1, S2 normal  Lungs: clear to auscultation bilaterally  Abdomen: Soft, nontender, no masses  Extremities:  No c/c/e, peripheral pulses present  I have personally reviewed patients ekg done       Labs: Results:       Chemistry Recent Labs      06/12/17   0127  06/11/17   0406  06/10/17   0230   GLU  99  84  115*   NA  140  139  135*   K  3.5  3.6  3.8   CL  107  105  102   CO2  28  26  23   BUN  20*  24*  18   CREA  0.69  0.71  0.66   CA  8.5  9.2  9.0   AGAP  5  8  10   BUCR  29*  34*  27*      CBC w/Diff Recent Labs      06/12/17   0127  06/11/17   0406  06/10/17   0230   WBC  7.6  8.6  11.7   RBC  3.36*  3.64*  3.94*   HGB  10.4*  11.5*  12.7   HCT  31.0*  33.6*  35.9   PLT  187  193  206   GRANS  33*  35*  68   LYMPH  58*  56*  26   EOS  2  3  1      Cardiac Enzymes Recent Labs      06/10/17   1600  06/10/17   0957   CPK  438*  371*   CKND1  7.1*  6.2*      Coagulation Recent Labs      06/12/17   0127  06/11/17   1504   PTP  12.6   --    INR  1.0   --    APTT  80.0*  80.6*       Lipid Panel Lab Results   Component Value Date/Time    Cholesterol, total 170 06/11/2017 04:06 AM    HDL Cholesterol 62 06/11/2017 04:06 AM    LDL, calculated 97 06/11/2017 04:06 AM    VLDL, calculated 11 06/11/2017 04:06 AM    Triglyceride 55 06/11/2017 04:06 AM    CHOL/HDL Ratio 2.7 06/11/2017 04:06 AM      BNP No results for input(s): BNPP in the last 72 hours. Liver Enzymes No results for input(s): TP, ALB, TBIL, AP, SGOT, GPT in the last 72 hours.     No lab exists for component: DBIL   Thyroid Studies No results found for: T4, T3U, TSH, TSHEXT, TSHEXT           Brianna Camargo MD   Pager # [phone]

## 2017-06-12 NOTE — PROGRESS NOTES
Hospitalist Progress Note    Patient: Shai Hicks MRN: [id number]  CSN: [id number]    YOB: 1960  Age: 49 y.o. Sex: female    DOA: 6/10/2017 LOS:  LOS: 2 days          S/p left heart catheterization, coronary angiography and left ventriculography revealing severe three-vessel CAD with excellent targets, and overall normal LV function. consideration for cabg recommended. Assessment/Plan     1. 3 vessel cad - asa, BB, ACE, statin, tobacco cessation. Await CT surgery eval   2. Hypertension  3. Dyslipidemia on statin  4. Likely COPD BD prn. Tobacco cessation. 5. Tobacco abuse - smoking cessation education. 6. Mild MR  7. Carotid pvls 1/2/17 Mild plaque (<50%) present in the bilateral internal carotid arteries  8. Echo 9/10/16 EF 45%, no PA pressure. HYPOKINESIS OF THE MID APICAL  SEPTAL SEGMENTS, MID ANTEROLATERAL SEGMENT, AND ANTERIOR SEGMENTS. 9. Cardiac cath 9/10/16   - Left main coronary artery: Normal.   - LAD: proximal 40% stenosis. D1 is occluded. - Left circumflex coronary artery: nondominant proximal 40% stenosis. - Right coronary artery: dominant, occluded mid portion with L-R collaterals. ramus prox 30-40% stenosis. - LEFT VENTRICLE: LVEDP- 12. EF- 40%. 10. PVLs LE 2014 negative for dvt  11. Unintentional weight loss - on multivit. Consulted nutritionist. She is 2 years overdue for colonoscopy, 1 year overdue for mammogram, and unclear how many years overdue for pap  12. underweight Body mass index is 19.21 kg/(m^2). see #11. 13. 2014 arterial dopplers: multi level, severe right lower extremity arterial insufficiency. Moderate left lower extremity arterial insufficiency at the level of the femoral-popliteal artery. Compared to the previous study of 12/03/2013, the right SHEELA has dropped from 0.57 to 0.40, the left SHEELA has dropped from 1.01 to 0.73.  13. dvt prophylaxis  14. Full code.  Lives at home by herself. stepdown    Additional Notes:      Case discussed with: [x]Patient  []Family  [x]Nursing  []Case Management  DVT Prophylaxis:  []Lovenox  []Hep SQ  []SCDs  []Coumadin   [x]On Heparin gtt    Vital signs/Intake and Output:  Visit Vitals    /85 (BP 1 Location: Left arm, BP Patient Position: At rest)    Pulse (!) 54    Temp 98.5 °F (36.9 °C)    Resp 15    Ht 5' 6\" (1.676 m)    Wt 54 kg (119 lb)    SpO2 100%    BMI 19.21 kg/m2     Current Shift:  06/12 0701 - 06/12 1900  In: 60 [I.V.:60]  Out: 200 [Urine:200]  Last three shifts:  06/10 1901 - 06/12 0700  In: 8174 [P.O.:600; I.V.:1891]  Out: 500 [Urine:500]    Awake alert and oriented x 4. Thin. Appears older than stated age. Ncat. perrl  RRR  Cta b.l  Soft nt nd nabs  No edema. Dressing to right wrist c/d/i  No focal deficit  No rash    Medications Reviewed      Labs: Results:       Chemistry Recent Labs      06/12/17   0127  06/11/17   0406  06/10/17   0230   GLU  99  84  115*   NA  140  139  135*   K  3.5  3.6  3.8   CL  107  105  102   CO2  28  26  23   BUN  20*  24*  18   CREA  0.69  0.71  0.66   CA  8.5  9.2  9.0   AGAP  5  8  10   BUCR  29*  34*  27*      CBC w/Diff Recent Labs      06/12/17   0127  06/11/17   0406  06/10/17   0230   WBC  7.6  8.6  11.7   RBC  3.36*  3.64*  3.94*   HGB  10.4*  11.5*  12.7   HCT  31.0*  33.6*  35.9   PLT  187  193  206   GRANS  33*  35*  68   LYMPH  58*  56*  26   EOS  2  3  1      Cardiac Enzymes Recent Labs      06/10/17   1600  06/10/17   0957   CPK  438*  371*   CKND1  7.1*  6.2*      Coagulation Recent Labs      06/12/17   0127  06/11/17   1504   PTP  12.6   --    INR  1.0   --    APTT  80.0*  80.6*       Lipid Panel Lab Results   Component Value Date/Time    Cholesterol, total 170 06/11/2017 04:06 AM    HDL Cholesterol 62 06/11/2017 04:06 AM    LDL, calculated 97 06/11/2017 04:06 AM    VLDL, calculated 11 06/11/2017 04:06 AM    Triglyceride 55 06/11/2017 04:06 AM    CHOL/HDL Ratio 2.7 06/11/2017 04:06 AM      BNP No results for input(s): BNPP in the last 72 hours. Liver Enzymes No results for input(s): TP, ALB, TBIL, AP, SGOT, GPT in the last 72 hours. No lab exists for component: DBIL   Thyroid Studies No results found for: T4, T3U, TSH, TSHEXT, TSHEXT     Procedures/imaging: see electronic medical records for all procedures/Xrays and details which were not copied into this note but were reviewed prior to creation of Plan.

## 2017-06-12 NOTE — CONSULTS
CARDIOTHORACIC SURGERY CONSULTATION NOTE    6/12/2017  5:40 PM    I am seeing this patient for the first time in consultation at the request of Dr. Seun Goins, with whom I have discussed the patient's history and test results, specifically the cardiac catheterization. I have also reviewed her records and pertinent studies. Derian Alejandre is a 49 y.o. female who presents with chest pain. The pain is severe and is located substernally without radiation, and she has also had palpitations. It is not associated with exertion, emotional stress, or eating. She has also been experiencing light headedness, but denies claudication, dizziness, fatigue, lower extremity edema, near-syncope, orthopnea, palpitations, paroxysmal nocturnal dyspnea, syncope, tachypnea. Cardiac risk factors include smoking/ tobacco exposure, dyslipidemia, hypertension. There is no history of family history, diabetes mellitus. Past Medical History:   Diagnosis Date    Essential hypertension, benign     Hypercholesterolemia     Hypertension     Other and unspecified hyperlipidemia     Personal history of tobacco use, presenting hazards to health     Discussed cessation    Shortness of breath     Possible asthma Mild MR, no clinical fluid overload       The past medical history is also notable for fractures of her 'butt', left ribs, left arm and her jaw. She also claims to have had bilateral DVTs.     Past Surgical History:   Procedure Laterality Date    CARDIAC CATHETERIZATION  6/12/2017         CORONARY ARTERY ANGIOGRAM  6/12/2017         HX ORTHOPAEDIC      foot surgery    LV ANGIOGRAPHY  6/12/2017         MODERATE SEDATION  6/12/2017              Present medications include   Current Facility-Administered Medications   Medication Dose Route Frequency Provider Last Rate Last Dose    sodium chloride (NS) flush 5-10 mL  5-10 mL IntraVENous Q8H Dawson Quintero MD   10 mL at 06/12/17 1511    sodium chloride (NS) flush 5-10 mL 5-10 mL IntraVENous PRN Yocasta Lee MD        docusate sodium (COLACE) capsule 100 mg  100 mg Oral BID Diamond Sandoval MD   Stopped at 06/12/17 0900    heparin 25,000 units in D5W 250 ml infusion  12-25 Units/kg/hr IntraVENous TITRATE Yovani Cabello MD 6.5 mL/hr at 06/12/17 1351 12 Units/kg/hr at 06/12/17 1351    albuterol (PROVENTIL VENTOLIN) nebulizer solution 2.5 mg  2.5 mg Nebulization Q4H PRN Glen Alejandra MD        cyclobenzaprine (FLEXERIL) tablet 10 mg  10 mg Oral TID PRN Glen Alejandra MD   10 mg at 06/11/17 0832    ketotifen (ZADITOR) 0.025 % (0.035 %) ophthalmic solution 1 Drop  1 Drop Both Eyes BID Glen Alejandra MD   Stopped at 06/10/17 1116    pantoprazole (PROTONIX) tablet 40 mg  40 mg Oral ACB Glen Alejandra MD   Stopped at 06/12/17 0730    aspirin (ASPIRIN) tablet 325 mg  325 mg Oral DAILY Glen Alejandra MD   325 mg at 06/12/17 0843    morphine injection 2 mg  2 mg IntraVENous Q4H PRN Glen Alejandra MD        atorvastatin (LIPITOR) tablet 20 mg  20 mg Oral QHS Glen Alejandra MD   20 mg at 06/11/17 2202    lisinopril (PRINIVIL, ZESTRIL) tablet 20 mg  20 mg Oral DAILY Diamond Sandoval MD   Stopped at 06/11/17 0900    therapeutic multivitamin (THERAGRAN) tablet 1 Tab  1 Tab Oral DAILY Diamond Sandoval MD   Stopped at 06/12/17 0900    metoprolol tartrate (LOPRESSOR) tablet 25 mg  25 mg Oral BID Diamond Sandoval MD   25 mg at 06/12/17 1455       Drug allergies: No Known Allergies    Family History: There is not a history of heart disease in the family. Social History: Smoking history as above (at least a pack a day for many years; has tried many times to quit without success). She consumes alcohol socially. She lives with her grandson and is employed as a .      REVIEW OF SYSTEMS:   Constitutional: positive for 10 lb weight gain over the last several months, fevers or chills  Integumentary: denies recent rashes  Eyes: positive for diplopia, transient visual loss  ENMT: denies hearing loss, sinus congestion, sore throat  Respiratory: positive for chronic cough, recent productive cough  Cardiovascular: as noted above in history   Gastrointestinal: positive for abdominal pain, diarrhea  Genitourinary: denies flank pain, dysuria  Musculoskeletal: positive for chronic back pain, joint pains, and leg cramping  Hematologic / Lymphatic: positive for easy bruisability, clots in legs  Neurological: positive for headaches, but no seizures  Psychiatric: positive for anxiety, depression    PHYSICAL EXAMINATION:  Vital signs:   BP Readings from Last 3 Encounters:   17 125/72   13 137/78   10/18/12 (!) 147/96     Temp (24hrs), Av.1 °F (36.7 °C), Min:97.6 °F (36.4 °C), Max:98.6 °F (37 °C)    Wt Readings from Last 3 Encounters:   17 54 kg (119 lb)   13 55.8 kg (123 lb)   10/18/12 56.2 kg (124 lb)     Ht Readings from Last 3 Encounters:   17 5' 6\" (1.676 m)   13 5' 6\" (1.676 m)   10/18/12 5' 6\" (1.676 m)     General appearance:  She appeared well-nourished and of small build. Integument: The skin was warm and dry and had fair turgor. There were not lower extremity venous stasis changes. Head and Neck: The head was normocephalic and was atraumatic. The neck was supple with good range of motion. Thyromegaly was absent, and cervical and supraclavicular lymphadenopathy were absent. EENMT: Conjunctivae were not injected. The sclerae were anicteric, and the nares were patent bilaterally. Oral mucosa were moist.  The tongue was in the midline. Dentition was fair. Back: CVA and vertebral tenderness were absent. She was not kyphotic. Lungs: Respirations were not labored, and the lungs were clear to auscultation bilaterally, without rales, without rhonchi, and without wheezes. Heart:   The rate and rhythm were regular, without an S3, without JVD, and with a grade II/VI soft systolic murmur heard best over the right upper sternal border without radiation. The PMI was not displaced laterally. Abdomen: The abdomen was scaphoid and was soft and was not tender, with good bowel sounds, and hepatomegaly was present, and splenomegaly was absent. Pulsatile masses and bruits in the abdomen were absent. Vascular: Carotid pulses were 1+ bilaterally without bruits, and radial pulses were 1+ bilaterally. Pedal pulses were absent bilaterally. Varicose veins were absent in both legs. Extremities: Clubbing was absent, cyanosis was absent, and pedal edema was absent. Neurologic: She was alert and oriented, and was a good historian. Strength and motion appeared normal and symmetrical in her extremities. Psychiatric: She was pleasant, calm, and had a normal affect. LABORATORIES:   Lab Results   Component Value Date/Time    WBC 7.6 06/12/2017 01:27 AM    HCT 31.0 (L) 06/12/2017 01:27 AM     06/12/2017 01:27 AM      Lab Results   Component Value Date/Time     06/12/2017 01:27 AM    K 3.5 06/12/2017 01:27 AM     06/12/2017 01:27 AM    CO2 28 06/12/2017 01:27 AM    GLU 99 06/12/2017 01:27 AM    BUN 20 (H) 06/12/2017 01:27 AM    CREA 0.69 06/12/2017 01:27 AM    CREA 0.71 06/11/2017 04:06 AM    CREA 0.66 06/10/2017 02:30 AM     INR 1  Cholesterol 170, triglycerides 55, HDL 62, and LDL 97  Troponin: 7.1      The chest x-ray image, which I have personally reviewed, demonstrates clear lungs bilaterally. The EKG print-out, which I have personally reviewed, shows sinus arrhythmia with an old IMI. I have also personally reviewed the cardiac catheterization images. They show an ejection fraction of 60%. The LVEDP was 16. Coronary arteriography was noted to reveal the following atheromatous plaque stenoses in the native coronary arteries:   proximal LAD 70%  1st diagonal 90%  1st obtuse marginal 60% and 2nd obtuse marginal 60%  proximal RCA occluded, with filling of distal branches by collaterals from the left  Acute marginal branch 70%. Impression:  Diagnoses:  1. Coronary artery disease (symptomatic, progressive, severe)  2. Non-ST-elevation myocardial infarction  3. Essential hypertension  4. hypercholesterolemia  5. Tobacco abuse  6. COPD    With 3-vessel CAD there would be potential benefit from surgical coronary revascularization utilizing a left internal mammary artery and saphenous vein grafts. .  I have discussed this operation in detail with her, along with the alternatives, which would include medical therapy and / or PCI. I also outlined the risks and benefits of the surgery, which would include, but not be limited to, operative mortality, stroke, pneumonia, renal failure, infection, bleeding and need for re-exploration, perioperative myocardial infarction, postoperative arrhythmias, sternal dehiscence, hand dysesthesias and/or weakness, and blood component transfusions. The estimated STS risks were calculated for this patient and were discussed with the patient as outlined above. Smoking cessation counseling was also provided, including as assessment of past attempts to stop smoking and assessment of the tools available to help with these efforts. We spent over 10 minutes discussing this. Recommendations: Following our discussion, she has decided to discuss it with her family before deciding. She will need the additional preoperative tests, which I will order, if surgery is to be performed:  HbA1c  Urinalysis  Echocardiogram  Venous mapping and marking  Pulmonary function testing  Room air arterial blood gas     Thank you for involving me in her care.     Riaz Saba MD

## 2017-06-12 NOTE — PROGRESS NOTES
TRANSFER - OUT REPORT:    Verbal report given to [place] on Gary Loser  being transferred to 2307 for routine post - op       Report consisted of patients Situation, Background, Assessment and   Recommendations(SBAR). Information from the following report(s) SBAR was reviewed with the receiving nurse. Lines:   Peripheral IV 06/10/17 Left Antecubital (Active)   Site Assessment Clean, dry, & intact 6/11/2017 10:02 PM   Phlebitis Assessment 0 6/11/2017 10:02 PM   Infiltration Assessment 0 6/11/2017 10:02 PM   Dressing Status Clean, dry, & intact 6/11/2017 10:02 PM   Dressing Type Tape;Transparent 6/11/2017 10:02 PM   Hub Color/Line Status Blue; Infusing 6/11/2017 10:02 PM       Peripheral IV 06/10/17 Left Wrist (Active)   Site Assessment Clean, dry, & intact 6/11/2017 10:02 PM   Phlebitis Assessment 0 6/11/2017 10:02 PM   Infiltration Assessment 0 6/11/2017 10:02 PM   Dressing Status Clean, dry, & intact 6/11/2017 10:02 PM   Dressing Type Tape;Transparent 6/11/2017 10:02 PM   Hub Color/Line Status Blue; Infusing 6/11/2017 10:02 PM        Opportunity for questions and clarification was provided.       Patient transported with:   Carol Liang

## 2017-06-12 NOTE — PROGRESS NOTES
1039 Patient arrived to cath holding from cath lab awake and alert, vital signs stable, Dstat band in place right radial, clean, dry and intact, no C/O pain at this time will continue to monitor. 1145 Band removed from right radial without difficulty per protocol. Light elastikon pressure dressing applied to insertion site. No bleeding or hematoma noted. Patient denies pain. Pulses palpable and brisk cap refill distal to cath site. Cath site instructions and post care including s/s of bleeding and methods of bleeding prevention reviewed with patient with verbalized understanding.

## 2017-06-12 NOTE — PROGRESS NOTES
Cath holding summary    Patient escorted to cath holding from 207, alert and oriented x 4, voicing no complaints. Placed on monitor, NPO since MN. Lab results, med rec and H&P reviewed on chart. PIV x 2 left arm, ASA given by floor nurse.

## 2017-06-12 NOTE — ROUTINE PROCESS
Bedside and Verbal shift change report given to [place] [employer] RNs (oncoming nurse) by Archie Vargas RN (offgoing nurse). Report included the following information SBAR, Kardex, MAR and Recent Results.     SITUATION:    Code Status: Full Code   Reason for Admission: Angina at rest [facility]   Angina at rest [facility]    [facility] day: 2   Problem List:       Hospital Problems  Never Reviewed          Codes Class Noted POA    Angina at rest [facility] ICD-10-CM: I20.8  ICD-9-CM: 413.9  6/10/2017 Unknown              BACKGROUND:    Past Medical History:   Past Medical History:   Diagnosis Date    Essential hypertension, benign     Hypercholesterolemia     Hypertension     Other and unspecified hyperlipidemia     Personal history of tobacco use, presenting hazards to health     Discussed cessation    Shortness of breath     Possible asthma Mild MR, no clinical fluid overload         Patient taking anticoagulants yes     ASSESSMENT:    Changes in Assessment Throughout Shift: NONE     Patient has Central Line: no Reasons if yes: na   Patient has Lombardo Cath: no Reasons if yes: na      Last Vitals:     Vitals:    06/11/17 2116 06/11/17 2202 06/12/17 0043 06/12/17 0418   BP: 111/62 104/62 109/62 124/68   Pulse: 65 60 65 (!) 53   Resp: 18  20 18   Temp: 98.6 °F (37 °C)  97.8 °F (36.6 °C) 97.6 °F (36.4 °C)   SpO2: 95%  98% 99%   Weight:       Height:            IV and DRAINS (will only show if present)   Peripheral IV 06/10/17 Left Antecubital-Site Assessment: Clean, dry, & intact  Peripheral IV 06/10/17 Left Wrist-Site Assessment: Clean, dry, & intact  [REMOVED] Peripheral IV 06/10/17 Right Hand-Site Assessment: Clean, dry, & intact     WOUND (if present)   Wound Type:  none   Dressing present Dressing Present : No   Wound Concerns/Notes:  none     PAIN    Pain Assessment    Pain Intensity 1: 0 (06/12/17 0418)    Pain Location 1: Chest    Pain Intervention(s) 1: Position    Patient Stated Pain Goal: 0  o Interventions for Pain:  none  o Intervention effective: no and na  o Time of last intervention:  See Mar   o Reassessment Completed: yes      Last 3 Weights:  Last 3 Recorded Weights in this Encounter    06/10/17 0223 06/10/17 0625 06/11/17 0402   Weight: 54.4 kg (120 lb) 51.3 kg (113 lb) 51.8 kg (114 lb 4.8 oz)     Weight change:      INTAKE/OUPUT    Current Shift: 06/11 1901 - 06/12 0700  In: 2131 [P.O.:240; I.V.:1891]  Out: 500 [Urine:500]    Last three shifts: 06/10 0701 - 06/11 1900  In: 984.3 [P.O.:960; I.V.:24.3]  Out: 500 [Urine:500]     LAB RESULTS     Recent Labs      06/12/17   0127  06/11/17   0406  06/10/17   0230   WBC  7.6  8.6  11.7   HGB  10.4*  11.5*  12.7   HCT  31.0*  33.6*  35.9   PLT  187  193  206        Recent Labs      06/12/17   0127  06/11/17   0406  06/10/17   0230   NA  140  139  135*   K  3.5  3.6  3.8   GLU  99  84  115*   BUN  20*  24*  18   CREA  0.69  0.71  0.66   CA  8.5  9.2  9.0   MG  2.2  2.5  2.2   INR  1.0   --    --        RECOMMENDATIONS AND DISCHARGE PLANNING     1. Pending tests/procedures/ Plan of Care or Other Needs:  Heart Cath     2. Discharge plan for patient and Needs/Barriers: none    3. Estimated Discharge Date: pending Posted on Whiteboard in Patients Room: yes      4. The patient's care plan was reviewed with the oncoming nurse. \"HEALS\" SAFETY CHECK      Fall Risk    Total Score: 1    Safety Measures: Safety Measures: Bed in low position, Call light within reach, Fall prevention (comment), Side rails X 3    A safety check occurred in the patient's room between off going nurse and oncoming nurse listed above.     The safety check included the below items  Area Items   H  High Alert Medications - Verify all high alert medication drips (heparin, PCA, etc.)   E  Equipment - Suction is set up for ALL patients (with yanker)  - Red plugs utilized for all equipment (IV pumps, etc.)  - WOWs wiped down at end of shift.  - Room stocked with oxygen, suction, and other unit-specific supplies   A  Alarms - Bed alarm is set for fall risk patients  - Ensure chair alarm is in place and activated if patient is up in a chair   L  Lines - Check IV for any infiltration  - Lombardo bag is empty if patient has a Lombardo   - Tubing and IV bags are labeled   S  Safety   - Room is clean, patient is clean, and equipment is clean. - Hallways are clear from equipment besides carts. - Fall bracelet on for fall risk patients  - Ensure room is clear and free of clutter  - Suction is set up for ALL patients (with yanker)  - Hallways are clear from equipment besides carts.    - Isolation precautions followed, supplies available outside room, sign posted     Lyndsey Henley RN

## 2017-06-12 NOTE — ROUTINE PROCESS
TRANSFER - OUT REPORT:    Verbal report given to ROMINA POST. (name) on Lisa Puentes  being transferred to [facility] ([facility] for routine progression of care       Report consisted of patients Situation, Background, Assessment and   Recommendations(SBAR). Information from the following report(s) SBAR, Procedure Summary and MAR was reviewed with the receiving nurse. Lines:   Peripheral IV 06/10/17 Left Antecubital (Active)   Site Assessment Clean, dry, & intact 6/11/2017 10:02 PM   Phlebitis Assessment 0 6/11/2017 10:02 PM   Infiltration Assessment 0 6/11/2017 10:02 PM   Dressing Status Clean, dry, & intact 6/11/2017 10:02 PM   Dressing Type Tape;Transparent 6/11/2017 10:02 PM   Hub Color/Line Status Blue; Infusing 6/11/2017 10:02 PM       Peripheral IV 06/10/17 Left Wrist (Active)   Site Assessment Clean, dry, & intact 6/11/2017 10:02 PM   Phlebitis Assessment 0 6/11/2017 10:02 PM   Infiltration Assessment 0 6/11/2017 10:02 PM   Dressing Status Clean, dry, & intact 6/11/2017 10:02 PM   Dressing Type Tape;Transparent 6/11/2017 10:02 PM   Hub Color/Line Status Blue; Infusing 6/11/2017 10:02 PM        Opportunity for questions and clarification was provided.       Patient transported with:   [place]

## 2017-06-13 ENCOUNTER — TELEPHONE (OUTPATIENT)
Dept: CARDIOTHORACIC SURGERY | Age: 57
End: 2017-06-13

## 2017-06-13 LAB
ANION GAP BLD CALC-SCNC: 5 MMOL/L (ref 3–18)
APTT PPP: 85.2 SEC (ref 23–36.4)
APTT PPP: 88.6 SEC (ref 23–36.4)
APTT PPP: 96.1 SEC (ref 23–36.4)
ATRIAL RATE: 54 BPM
BUN SERPL-MCNC: 19 MG/DL (ref 7–18)
BUN/CREAT SERPL: 28 (ref 12–20)
CALCIUM SERPL-MCNC: 8.8 MG/DL (ref 8.5–10.1)
CALCULATED P AXIS, ECG09: 59 DEGREES
CALCULATED R AXIS, ECG10: 88 DEGREES
CALCULATED T AXIS, ECG11: 125 DEGREES
CHLORIDE SERPL-SCNC: 105 MMOL/L (ref 100–108)
CO2 SERPL-SCNC: 31 MMOL/L (ref 21–32)
CREAT SERPL-MCNC: 0.69 MG/DL (ref 0.6–1.3)
DIAGNOSIS, 93000: NORMAL
GLUCOSE SERPL-MCNC: 106 MG/DL (ref 74–99)
P-R INTERVAL, ECG05: 190 MS
POTASSIUM SERPL-SCNC: 3.8 MMOL/L (ref 3.5–5.5)
Q-T INTERVAL, ECG07: 524 MS
QRS DURATION, ECG06: 94 MS
QTC CALCULATION (BEZET), ECG08: 496 MS
SODIUM SERPL-SCNC: 141 MMOL/L (ref 136–145)
VENTRICULAR RATE, ECG03: 54 BPM

## 2017-06-13 PROCEDURE — 36415 COLL VENOUS BLD VENIPUNCTURE: CPT | Performed by: HOSPITALIST

## 2017-06-13 PROCEDURE — 93306 TTE W/DOPPLER COMPLETE: CPT

## 2017-06-13 PROCEDURE — 65660000004 HC RM CVT STEPDOWN

## 2017-06-13 PROCEDURE — 74011250637 HC RX REV CODE- 250/637: Performed by: PHYSICIAN ASSISTANT

## 2017-06-13 PROCEDURE — 74011250637 HC RX REV CODE- 250/637: Performed by: INTERNAL MEDICINE

## 2017-06-13 PROCEDURE — 85730 THROMBOPLASTIN TIME PARTIAL: CPT | Performed by: HOSPITALIST

## 2017-06-13 PROCEDURE — 74011250637 HC RX REV CODE- 250/637: Performed by: HOSPITALIST

## 2017-06-13 PROCEDURE — 93005 ELECTROCARDIOGRAM TRACING: CPT

## 2017-06-13 PROCEDURE — 80048 BASIC METABOLIC PNL TOTAL CA: CPT | Performed by: HOSPITALIST

## 2017-06-13 PROCEDURE — 74011250636 HC RX REV CODE- 250/636: Performed by: EMERGENCY MEDICINE

## 2017-06-13 PROCEDURE — 85730 THROMBOPLASTIN TIME PARTIAL: CPT | Performed by: INTERNAL MEDICINE

## 2017-06-13 RX ORDER — LISINOPRIL 5 MG/1
5 TABLET ORAL DAILY
Status: DISCONTINUED | OUTPATIENT
Start: 2017-06-14 | End: 2017-06-14 | Stop reason: HOSPADM

## 2017-06-13 RX ORDER — LANOLIN ALCOHOL/MO/W.PET/CERES
325 CREAM (GRAM) TOPICAL
COMMUNITY
End: 2017-06-14

## 2017-06-13 RX ORDER — BISACODYL 5 MG
10 TABLET, DELAYED RELEASE (ENTERIC COATED) ORAL DAILY
Status: DISCONTINUED | OUTPATIENT
Start: 2017-06-14 | End: 2017-06-14 | Stop reason: HOSPADM

## 2017-06-13 RX ORDER — POLYETHYLENE GLYCOL 3350 17 G/17G
17 POWDER, FOR SOLUTION ORAL DAILY
Status: DISCONTINUED | OUTPATIENT
Start: 2017-06-13 | End: 2017-06-14 | Stop reason: HOSPADM

## 2017-06-13 RX ORDER — CLOPIDOGREL BISULFATE 75 MG/1
75 TABLET ORAL DAILY
COMMUNITY
End: 2017-06-14

## 2017-06-13 RX ORDER — ROSUVASTATIN CALCIUM 10 MG/1
10 TABLET, COATED ORAL
COMMUNITY
End: 2017-06-14

## 2017-06-13 RX ORDER — AMLODIPINE BESYLATE 2.5 MG/1
2.5 TABLET ORAL DAILY
Status: DISCONTINUED | OUTPATIENT
Start: 2017-06-13 | End: 2017-06-14 | Stop reason: HOSPADM

## 2017-06-13 RX ORDER — ASPIRIN 81 MG/1
81 TABLET ORAL DAILY
COMMUNITY
End: 2017-06-14

## 2017-06-13 RX ORDER — ESCITALOPRAM OXALATE 10 MG/1
10 TABLET ORAL DAILY
COMMUNITY
End: 2017-09-19

## 2017-06-13 RX ADMIN — THERA TABS 1 TABLET: TAB at 08:53

## 2017-06-13 RX ADMIN — POLYETHYLENE GLYCOL 3350 17 G: 17 POWDER, FOR SOLUTION ORAL at 11:09

## 2017-06-13 RX ADMIN — DOCUSATE SODIUM 100 MG: 100 CAPSULE, LIQUID FILLED ORAL at 17:44

## 2017-06-13 RX ADMIN — PANTOPRAZOLE SODIUM 40 MG: 40 TABLET, DELAYED RELEASE ORAL at 08:53

## 2017-06-13 RX ADMIN — LISINOPRIL 20 MG: 20 TABLET ORAL at 08:53

## 2017-06-13 RX ADMIN — METOPROLOL TARTRATE 25 MG: 25 TABLET ORAL at 12:06

## 2017-06-13 RX ADMIN — METOPROLOL TARTRATE 25 MG: 25 TABLET ORAL at 01:00

## 2017-06-13 RX ADMIN — ASPIRIN 325 MG ORAL TABLET 325 MG: 325 PILL ORAL at 08:53

## 2017-06-13 RX ADMIN — DOCUSATE SODIUM 100 MG: 100 CAPSULE, LIQUID FILLED ORAL at 08:53

## 2017-06-13 RX ADMIN — Medication 10 ML: at 22:45

## 2017-06-13 RX ADMIN — ATORVASTATIN CALCIUM 20 MG: 20 TABLET, FILM COATED ORAL at 22:52

## 2017-06-13 RX ADMIN — Medication 10 ML: at 06:00

## 2017-06-13 RX ADMIN — AMLODIPINE BESYLATE 2.5 MG: 2.5 TABLET ORAL at 11:09

## 2017-06-13 RX ADMIN — HEPARIN SODIUM AND DEXTROSE 13.79 UNITS/KG/HR: 10000; 5 INJECTION INTRAVENOUS at 13:01

## 2017-06-13 NOTE — PROGRESS NOTES
[facility]        She is still undecided on course of treatment and requests her uncle to be involve in decision process. Patient denies SOB or cough but reports chest pressure since yesterday-different from the pain she had before admission. She complains of constipation. Her lungs are clear and heart sounds RRR. Her abdomen is soft and non-tender. Her extremities are warm and well perfused without edema. PLAN: add cathartics. ? Add nitrates to heparin drip. Dr. Kae Wei to see later. D/W Dr. Angel Gold. CARDIOTHORACIC ATTENDING STAFF NOTE    Patient resting comfortably in bed. No significant events in past 24 hours. Denies chest pain. Stable VS.     Oxygen saturation of 100% on room air. Echo:  Good LV function with no obvious valvular disease    Patient has spoken with family and they are recommending that she have her operation at [facility].    We would be happy to perform the procedure here; please contact us if she changes her mind.     Portia Fajardo MD

## 2017-06-13 NOTE — TELEPHONE ENCOUNTER
Ignacio Millan - the sister called asking questions about her sister's consult with Dr. Fidelina Snow. I let her know that since the patient is not actually a patient of our office and the patient is till a patient of [facility] she will need to have the nurse who is taking care of her sister contact Dr. Fidelina Snow for further questions. I did let her know that Dr. Fidelina Snow was in surgery at the moment  But that I could relay the messg. That she had more questions for him such as can we transport patient to [facility] and have Dr. Fidelina Snow do the surgery there. I let her know htat he does all his surgeries at the heart center here at [facility] however if she would like I can get Dr. Fidelina Snow to call her. She would not give me her # she just wanted to speak him face to face. I advised that I will let Dr. Fidelina Snow know of her concerns. I only see Ro Aquino in the system and someone for emergency contact. I again  Let her know that I would let Dr. Fidelina Snow know she had concerns and to please make contact with the patient who is currently an in-patient at [facility].

## 2017-06-13 NOTE — PROGRESS NOTES
[facility]      CARDIOLOGY PROGRESS NOTE  RECS:      1. Acute non stemi-s/p LHC 6/12/17 that revealed multivessel disease. Stable no chest pain or chest pressure. continue heparin,asa,bb, statin. Reduce acei and add norvasc. Start IS. I have contacted Dr. Maranda Mcdonough office for surgical opinion. d/w ABDIRAHMAN Mckeon. Follow echo report to assess lvf, rvf or any wma   2. Hypertension-stable  3. Tobacco abuse-Patient advised to stop smoking to reduce future cardiovascular events. 4. Hyperlipidemia- on statin.     Right wrist insertion site no hematoma no s/s infection able to palpate radial pulse      ASSESSMENT:  Hospital Problems  Never Reviewed          Codes Class Noted POA    Angina at rest [facility]) ICD-10-CM: I20.8  ICD-9-CM: 413.9  6/10/2017 Unknown        Essential hypertension, benign ICD-10-CM: I10  ICD-9-CM: 401.1  Unknown Yes        Other and unspecified hyperlipidemia ICD-10-CM: E78.5  ICD-9-CM: 272.4  Unknown Yes        Personal history of tobacco use, presenting hazards to health ICD-10-CM: Z87.891  ICD-9-CM: V15.82  Unknown Yes    Overview Signed 4/1/2013  8:48 PM by Harjinder Avelar     Discussed cessation             Shortness of breath ICD-10-CM: R06.02  ICD-9-CM: 786.05  Unknown Yes    Overview Signed 4/1/2013  8:49 PM by Harjinder Avelar     Possible asthma  Mild MR, no clinical fluid overload                     SUBJECTIVE:      C/o minor chest pressure intermittently- spont relief  C/o intermittent SOB- relief by taking a single deep breath    OBJECTIVE:    VS:   Visit Vitals    /76 (BP 1 Location: Right arm, BP Patient Position: At rest)    Pulse 81    Temp 97.9 °F (36.6 °C)    Resp 20    Ht 5' 6\" (1.676 m)    Wt 54 kg (119 lb)    SpO2 93%    BMI 19.21 kg/m2         Intake/Output Summary (Last 24 hours) at 06/13/17 0946  Last data filed at 06/13/17 0153   Gross per 24 hour   Intake          1456.08 ml   Output              400 ml   Net          1056.08 ml TELE: dhaval quiroz    General: alert and in no apparent distress  HENT: Normocephalic, atraumatic. Normal external eye. Neck :  no JVD  Cardiac:  S1, S2 normal  Lungs: clear to auscultation bilaterally  Abdomen: Soft, nontender, no masses  Extremities:  No c/c/e, peripheral pulses present        Labs: Results:       Chemistry Recent Labs      06/13/17   0046  06/12/17   0127  06/11/17   0406   GLU  106*  99  84   NA  141  140  139   K  3.8  3.5  3.6   CL  105  107  105   CO2  31  28  26   BUN  19*  20*  24*   CREA  0.69  0.69  0.71   CA  8.8  8.5  9.2   AGAP  5  5  8   BUCR  28*  29*  34*      CBC w/Diff Recent Labs      06/12/17   0127  06/11/17   0406   WBC  7.6  8.6   RBC  3.36*  3.64*   HGB  10.4*  11.5*   HCT  31.0*  33.6*   PLT  187  193   GRANS  33*  35*   LYMPH  58*  56*   EOS  2  3      Cardiac Enzymes Recent Labs      06/10/17   1600  06/10/17   0957   CPK  438*  371*   CKND1  7.1*  6.2*      Coagulation Recent Labs      06/13/17   0046  06/12/17   2136   06/12/17   0127   PTP   --    --    --   12.6   INR   --    --    --   1.0   APTT  85.2*  77.9*   < >  80.0*    < > = values in this interval not displayed. Lipid Panel Lab Results   Component Value Date/Time    Cholesterol, total 170 06/11/2017 04:06 AM    HDL Cholesterol 62 06/11/2017 04:06 AM    LDL, calculated 97 06/11/2017 04:06 AM    VLDL, calculated 11 06/11/2017 04:06 AM    Triglyceride 55 06/11/2017 04:06 AM    CHOL/HDL Ratio 2.7 06/11/2017 04:06 AM      BNP No results for input(s): BNPP in the last 72 hours. Liver Enzymes No results for input(s): TP, ALB, TBIL, AP, SGOT, GPT in the last 72 hours. No lab exists for component: DBIL   Thyroid Studies No results found for: T4, T3U, TSH, TSHEXT, TSHEXT           Marcella E Halecki, BETHEL       Patient seen independently  Discussed the details with NP and patient.  Please see orders & recommendations  Rita Ho MD

## 2017-06-13 NOTE — PROGRESS NOTES
NUTRITION    Nutrition Consult: General Nutrition Management & Supplements     RECOMMENDATIONS / PLAN:     - Add supplements: Ensure Enlive TID.  - Follow up for diet education as schedule permits. - Continue RD inpatient monitoring and evaluation. NUTRITION INTERVENTIONS & DIAGNOSIS:     [x] Meals/Snacks: modified diet   [x] Medical food supplementation: initiate    [x] Nutrition education/counseling: follow up for cardiac diet education as medically appropriate     Nutrition Diagnosis: Unintentional weight loss related to inadequate energy intake as evidenced by 11 lb, 8% weight loss x 5 months. ASSESSMENT:     6/13:  Agreeable to supplements. 6/12:  NPO for procedure. Out of room at time of visit.       Average po intake adequate to meet patients estimated nutritional needs:   [x] Yes     [] No   [] Unable to determine at this time    Diet: DIET CARDIAC Regular      Food Allergies: NKFA  Current Appetite:   [] Good     [] Fair     [] Poor     [x] Other: unknown    Appetite/meal intake prior to admission:   [] Good     [x] Fair     [x] Poor     [x] Other: often consuming one meal per day due to smoking/tobacco abuse   Feeding Limitations:  [] Swallowing difficulty    [] Chewing difficulty    [] Other:  Current Meal Intake:   Patient Vitals for the past 100 hrs:   % Diet Eaten   06/12/17 1743 75 %   06/11/17 1850 25 %   06/11/17 0927 25 %   06/10/17 1405 100 %   06/10/17 0810 100 %     BM: 6/10   Skin Integrity: WDL  Edema: none   Pertinent Medications: Reviewed    Recent Labs      06/13/17   0046  06/12/17   0127  06/11/17   0406   NA  141  140  139   K  3.8  3.5  3.6   CL  105  107  105   CO2  31  28  26   GLU  106*  99  84   BUN  19*  20*  24*   CREA  0.69  0.69  0.71   CA  8.8  8.5  9.2   MG   --   2.2  2.5   PHOS   --    --   3.8       Intake/Output Summary (Last 24 hours) at 06/13/17 1432  Last data filed at 06/13/17 0153   Gross per 24 hour   Intake          1396.08 ml   Output              400 ml Net           996.08 ml       Anthropometrics:  Ht Readings from Last 1 Encounters:   06/13/17 5' 6\" (1.676 m)     Last 3 Recorded Weights in this Encounter    06/12/17 0647 06/12/17 0900 06/13/17 0442   Weight: 54.1 kg (119 lb 4.8 oz) 54 kg (119 lb) 54 kg (119 lb)     Body mass index is 19.21 kg/(m^2). Borderline Underweight     Weight History: 5 lb, 4% weight loss per chart; patient reports weight loss and previous weight of 130 lb in January (11 lb, 8% weight loss x 5 months)      Weight Metrics 6/13/2017 5/31/2013 4/1/2013   Weight 119 lb 123 lb 124 lb   BMI 19.21 kg/m2 19.86 kg/m2 20.02 kg/m2        Admitting Diagnosis: Angina at rest [facility])  Angina at rest [facility])  Past Medical History:   Diagnosis Date    Essential hypertension, benign     Hypercholesterolemia     Hypertension     Other and unspecified hyperlipidemia     Personal history of tobacco use, presenting hazards to health     Discussed cessation    Shortness of breath     Possible asthma Mild MR, no clinical fluid overload       Education Needs:        [x] None identified  [] Identified - Not appropriate at this time  []  Identified and addressed - refer to education log  Learning Limitations:   [x] None identified  [] Identified    Cultural, Confucianism & ethnic food preferences:  [x] None identified    [] Identified and addressed     ESTIMATED NUTRITION NEEDS:     Calories: 7614-9349 kcal (MSJx1.2-1.3) based on  [x] Actual BW 54 kg     [] IBW   Protein: 43-54 gm (0.8-1 gm/kg) based on  [x] Actual BW      [] IBW   Fluid: 1 mL/kcal     MONITORING & EVALUATION:     Nutrition Goal(s):   1. Po intake of meals will meet >75% of patient estimated nutritional needs within the next 7 days.   Outcome:  [] Met/Ongoing    [x]  Not Met    [] New/Initial Goal     Monitoring:   [x] Diet tolerance   [x] Meal intake   [x] Supplement intake   [] GI symptoms/ability to tolerate po diet   [] Respiratory status   [] Plan of care      Previous Recommendations (for follow-up assessments only):     []   Implemented       []   Not Implemented (RD to address)     [] No Recommendation Made     Discharge Planning: cardiac diet   [x] Participated in care planning, discharge planning, & interdisciplinary rounds as appropriate      Henrik Hernandez, [address], [address] [place] [address]    Pager: [phone]

## 2017-06-13 NOTE — PROGRESS NOTES
Hospitalist Progress Note    Patient: Ke Mendoza MRN: [id number]  CSN: [id number]    YOB: 1960  Age: 49 y.o. Sex: female    DOA: 6/10/2017 LOS:  LOS: 3 days          Patient sitting in bed in NAD, awake, alert, follows commands, denies CP or sob. Sister at bedside    Assessment/Plan     1. 3 vessel cad - asa, BB, statin, heparin, cardio on case, CT surg input noted  2. Hypertension- on ace  3. Dyslipidemia- statin  4. Likely COPD - nebs as needed  5.counseled smoking cessation  6. Mild MR  7- moderate protein calorie malnutrition, underweight  D/w patient and sister, they request to go to [facility] for CABG, d/w cardiologist and he will reach out to [facility] Vick Gonzalo at [facility] tomorrow and let me know. Additional Notes:      Case discussed with:  [x]Patient  [x]Family  []Nursing  []Case Management  DVT Prophylaxis:  []Lovenox  []Hep SQ  []SCDs  []Coumadin   [x]On Heparin gtt    Vital signs/Intake and Output:  Visit Vitals    /67 (BP 1 Location: Right arm, BP Patient Position: At rest)    Pulse (!) 55    Temp 98.8 °F (37.1 °C)    Resp 16    Ht 5' 6\" (1.676 m)    Wt 54 kg (119 lb)    SpO2 98%    BMI 19.21 kg/m2       General:  Awake, alert, follows commands  Cardiovascular:  S1S2+, RRR  Pulmonary:  CTA b/l  GI:  Soft, BS+, NT, ND  Extremities:  No edema          Labs: Results:       Chemistry Recent Labs      06/13/17   0046  06/12/17   0127  06/11/17   0406   GLU  106*  99  84   NA  141  140  139   K  3.8  3.5  3.6   CL  105  107  105   CO2  31  28  26   BUN  19*  20*  24*   CREA  0.69  0.69  0.71   CA  8.8  8.5  9.2   AGAP  5  5  8   BUCR  28*  29*  34*      CBC w/Diff Recent Labs      06/12/17   0127  06/11/17   0406   WBC  7.6  8.6   RBC  3.36*  3.64*   HGB  10.4*  11.5*   HCT  31.0*  33.6*   PLT  187  193   GRANS  33*  35*   LYMPH  58*  56*   EOS  2  3      Cardiac Enzymes No results for input(s): CPK, CKND1, SANTIAGO in the last 72 hours.     No lab exists for component: Raciel Scot Coagulation Recent Labs      06/13/17   1127  06/13/17   0046   06/12/17   0127   PTP   --    --    --   12.6   INR   --    --    --   1.0   APTT  96.1*  85.2*   < >  80.0*    < > = values in this interval not displayed. Lipid Panel Lab Results   Component Value Date/Time    Cholesterol, total 170 06/11/2017 04:06 AM    HDL Cholesterol 62 06/11/2017 04:06 AM    LDL, calculated 97 06/11/2017 04:06 AM    VLDL, calculated 11 06/11/2017 04:06 AM    Triglyceride 55 06/11/2017 04:06 AM    CHOL/HDL Ratio 2.7 06/11/2017 04:06 AM      BNP No results for input(s): BNPP in the last 72 hours. Liver Enzymes No results for input(s): TP, ALB, TBIL, AP, SGOT, GPT in the last 72 hours. No lab exists for component: DBIL   Thyroid Studies No results found for: T4, T3U, TSH, TSHEXT, TSHEXT     Procedures/imaging: see electronic medical records for all procedures/Xrays and details which were not copied into this note but were reviewed prior to creation of Plan.     Salvador Dasilva MD

## 2017-06-13 NOTE — PROGRESS NOTES
conducted an initial consultation and Spiritual Assessment for Lisa Puentes, who is a 49 y.o.,female. Patients Primary Language is: [place]. According to the patients EMR Buddhist Affiliation is: No Temple. The reason the Patient came to the hospital is:   Patient Active Problem List    Diagnosis Date Noted    Angina at rest [facility]) 06/10/2017    Essential hypertension, benign     Other and unspecified hyperlipidemia     Personal history of tobacco use, presenting hazards to health     Shortness of breath         The  provided the following Interventions:  Initiated a relationship of care and support. Patient said she is trying to decide about the surgery that has been recommended for her heart condition. It is scary for her. Explored issues of kavon, spirituality and/or Restorationist needs while hospitalized. Patient expressed she is [place] and her kavon is very important to her daily life. Listened empathically. Patient has had a difficult life. Two of her children have . One is remaining. Provided chaplaincy education. Provided information about Spiritual Care Services. Offered prayer and assurance of continued prayers on patient's behalf. Chart reviewed. The following outcomes were achieved:  Patient shared some information about their medical narrative and spiritual journey/beliefs. Patient processed feeling about current hospitalization. Patient expressed gratitude for the 's visit. Assessment:  Patient did not indicate any spiritual or Restorationist issues which require Spiritual Care Services interventions at this time. Patient does not have any Restorationist/cultural needs that will affect patients preferences in health care. Plan:  Chaplains will continue to follow and will provide pastoral care on an as needed or requested basis.    recommends patient page  on duty if patient shows signs of acute spiritual or emotional distress. Lyndon Spear MDiv.   Board Certified [employer] [phone]

## 2017-06-13 NOTE — ROUTINE PROCESS
Bedside turnover given to me by ROMINA Marr. Pt is on the cardiac telemetry monitor in stable condition. She is on cardiac telemetry hardwire box 2307. During the night I spoke with pt about her catheterization, she asked to have the wrist splint off. I removed it and put a smaller bandage on her wrist, it was dry, no bleeding and no hematomas. During the night I assisted her to the restroom several times, She is steady on her feet and ambulates without difficulty. Bedside turnover given to ROMINA Hewitt. Pt is still on the cardiac telemetry monitor, vitals WNL and heparin is still infusing.//  SBAR, MAR and ED Summary given. /'/

## 2017-06-14 VITALS
OXYGEN SATURATION: 100 % | HEIGHT: 66 IN | TEMPERATURE: 98.7 F | DIASTOLIC BLOOD PRESSURE: 65 MMHG | BODY MASS INDEX: 19.61 KG/M2 | SYSTOLIC BLOOD PRESSURE: 119 MMHG | HEART RATE: 57 BPM | RESPIRATION RATE: 18 BRPM | WEIGHT: 122 LBS

## 2017-06-14 LAB
ANION GAP BLD CALC-SCNC: 5 MMOL/L (ref 3–18)
APTT PPP: 88.9 SEC (ref 23–36.4)
APTT PPP: 91.8 SEC (ref 23–36.4)
ATRIAL RATE: 48 BPM
BASOPHILS # BLD AUTO: 0 K/UL (ref 0–0.1)
BASOPHILS # BLD: 0 % (ref 0–2)
BUN SERPL-MCNC: 15 MG/DL (ref 7–18)
BUN/CREAT SERPL: 23 (ref 12–20)
CALCIUM SERPL-MCNC: 8.7 MG/DL (ref 8.5–10.1)
CALCULATED P AXIS, ECG09: 42 DEGREES
CALCULATED R AXIS, ECG10: 80 DEGREES
CALCULATED T AXIS, ECG11: 115 DEGREES
CHLORIDE SERPL-SCNC: 103 MMOL/L (ref 100–108)
CO2 SERPL-SCNC: 30 MMOL/L (ref 21–32)
CREAT SERPL-MCNC: 0.66 MG/DL (ref 0.6–1.3)
DIAGNOSIS, 93000: NORMAL
DIFFERENTIAL METHOD BLD: ABNORMAL
EOSINOPHIL # BLD: 0.3 K/UL (ref 0–0.4)
EOSINOPHIL NFR BLD: 3 % (ref 0–5)
ERYTHROCYTE [DISTWIDTH] IN BLOOD BY AUTOMATED COUNT: 13.5 % (ref 11.6–14.5)
GLUCOSE SERPL-MCNC: 89 MG/DL (ref 74–99)
HCT VFR BLD AUTO: 31.3 % (ref 35–45)
HGB BLD-MCNC: 10.8 G/DL (ref 12–16)
LYMPHOCYTES # BLD AUTO: 50 % (ref 21–52)
LYMPHOCYTES # BLD: 4 K/UL (ref 0.9–3.6)
MAGNESIUM SERPL-MCNC: 2.1 MG/DL (ref 1.6–2.6)
MCH RBC QN AUTO: 31.6 PG (ref 24–34)
MCHC RBC AUTO-ENTMCNC: 34.5 G/DL (ref 31–37)
MCV RBC AUTO: 91.5 FL (ref 74–97)
MONOCYTES # BLD: 0.5 K/UL (ref 0.05–1.2)
MONOCYTES NFR BLD AUTO: 7 % (ref 3–10)
NEUTS SEG # BLD: 3.2 K/UL (ref 1.8–8)
NEUTS SEG NFR BLD AUTO: 40 % (ref 40–73)
P-R INTERVAL, ECG05: 172 MS
PLATELET # BLD AUTO: 181 K/UL (ref 135–420)
PMV BLD AUTO: 9.8 FL (ref 9.2–11.8)
POTASSIUM SERPL-SCNC: 3.8 MMOL/L (ref 3.5–5.5)
Q-T INTERVAL, ECG07: 504 MS
QRS DURATION, ECG06: 88 MS
QTC CALCULATION (BEZET), ECG08: 450 MS
RBC # BLD AUTO: 3.42 M/UL (ref 4.2–5.3)
SODIUM SERPL-SCNC: 138 MMOL/L (ref 136–145)
VENTRICULAR RATE, ECG03: 48 BPM
WBC # BLD AUTO: 8 K/UL (ref 4.6–13.2)

## 2017-06-14 PROCEDURE — 85025 COMPLETE CBC W/AUTO DIFF WBC: CPT | Performed by: INTERNAL MEDICINE

## 2017-06-14 PROCEDURE — 80048 BASIC METABOLIC PNL TOTAL CA: CPT | Performed by: INTERNAL MEDICINE

## 2017-06-14 PROCEDURE — 74011250637 HC RX REV CODE- 250/637: Performed by: HOSPITALIST

## 2017-06-14 PROCEDURE — 85730 THROMBOPLASTIN TIME PARTIAL: CPT | Performed by: INTERNAL MEDICINE

## 2017-06-14 PROCEDURE — 36415 COLL VENOUS BLD VENIPUNCTURE: CPT | Performed by: INTERNAL MEDICINE

## 2017-06-14 PROCEDURE — 74011250637 HC RX REV CODE- 250/637: Performed by: INTERNAL MEDICINE

## 2017-06-14 PROCEDURE — 74011250636 HC RX REV CODE- 250/636: Performed by: INTERNAL MEDICINE

## 2017-06-14 PROCEDURE — 83735 ASSAY OF MAGNESIUM: CPT | Performed by: INTERNAL MEDICINE

## 2017-06-14 PROCEDURE — 93005 ELECTROCARDIOGRAM TRACING: CPT

## 2017-06-14 RX ORDER — METOPROLOL TARTRATE 25 MG/1
25 TABLET, FILM COATED ORAL 2 TIMES DAILY
Qty: 60 TAB | Refills: 0 | Status: SHIPPED
Start: 2017-06-14 | End: 2017-07-05

## 2017-06-14 RX ORDER — THERA TABS 400 MCG
1 TAB ORAL DAILY
Qty: 30 TAB | Refills: 0 | Status: SHIPPED
Start: 2017-06-14 | End: 2017-07-05

## 2017-06-14 RX ORDER — POLYETHYLENE GLYCOL 3350 17 G/17G
17 POWDER, FOR SOLUTION ORAL DAILY
Qty: 30 PACKET | Refills: 0 | Status: SHIPPED
Start: 2017-06-14 | End: 2017-08-14

## 2017-06-14 RX ORDER — PANTOPRAZOLE SODIUM 40 MG/1
40 TABLET, DELAYED RELEASE ORAL
Qty: 30 TAB | Refills: 0 | Status: SHIPPED
Start: 2017-06-14 | End: 2017-07-05

## 2017-06-14 RX ORDER — ATORVASTATIN CALCIUM 20 MG/1
20 TABLET, FILM COATED ORAL
Qty: 30 TAB | Refills: 0 | Status: SHIPPED
Start: 2017-06-14 | End: 2017-07-05

## 2017-06-14 RX ORDER — AMLODIPINE BESYLATE 2.5 MG/1
2.5 TABLET ORAL DAILY
Qty: 30 TAB | Refills: 0 | Status: SHIPPED
Start: 2017-06-14 | End: 2017-07-05

## 2017-06-14 RX ORDER — DOCUSATE SODIUM 100 MG/1
100 CAPSULE, LIQUID FILLED ORAL 2 TIMES DAILY
Qty: 30 CAP | Refills: 0 | Status: SHIPPED
Start: 2017-06-14 | End: 2017-07-05

## 2017-06-14 RX ORDER — ASPIRIN 325 MG
325 TABLET ORAL DAILY
Qty: 30 TAB | Refills: 0 | Status: SHIPPED
Start: 2017-06-14 | End: 2017-07-05

## 2017-06-14 RX ORDER — LISINOPRIL 5 MG/1
5 TABLET ORAL DAILY
Qty: 30 TAB | Refills: 0 | Status: SHIPPED
Start: 2017-06-14 | End: 2017-07-05

## 2017-06-14 RX ORDER — SODIUM CHLORIDE 9 MG/ML
50 INJECTION, SOLUTION INTRAVENOUS CONTINUOUS
Status: DISCONTINUED | OUTPATIENT
Start: 2017-06-14 | End: 2017-06-14 | Stop reason: HOSPADM

## 2017-06-14 RX ORDER — HEPARIN SODIUM 10000 [USP'U]/100ML
12-25 INJECTION, SOLUTION INTRAVENOUS
Qty: 50 ML | Refills: 0 | Status: SHIPPED
Start: 2017-06-14 | End: 2017-07-05

## 2017-06-14 RX ADMIN — ASPIRIN 325 MG ORAL TABLET 325 MG: 325 PILL ORAL at 09:26

## 2017-06-14 RX ADMIN — DOCUSATE SODIUM 100 MG: 100 CAPSULE, LIQUID FILLED ORAL at 09:26

## 2017-06-14 RX ADMIN — METOPROLOL TARTRATE 25 MG: 25 TABLET ORAL at 01:30

## 2017-06-14 RX ADMIN — THERA TABS 1 TABLET: TAB at 09:27

## 2017-06-14 RX ADMIN — METOPROLOL TARTRATE 25 MG: 25 TABLET ORAL at 13:00

## 2017-06-14 RX ADMIN — PANTOPRAZOLE SODIUM 40 MG: 40 TABLET, DELAYED RELEASE ORAL at 06:36

## 2017-06-14 RX ADMIN — LISINOPRIL 5 MG: 5 TABLET ORAL at 09:27

## 2017-06-14 RX ADMIN — AMLODIPINE BESYLATE 2.5 MG: 2.5 TABLET ORAL at 09:27

## 2017-06-14 RX ADMIN — SODIUM CHLORIDE 50 ML/HR: 900 INJECTION, SOLUTION INTRAVENOUS at 12:15

## 2017-06-14 RX ADMIN — Medication 10 ML: at 06:38

## 2017-06-14 NOTE — PROGRESS NOTES
[facility]      CARDIOLOGY PROGRESS NOTE  RECS:      1. Acute non stemi-s/p LHC 6/12/17 that revealed multivessel disease. Stable no chest pain or chest pressure. continue heparin,asa,bb, statin. Patient wants to have her operation at [address] [place] [address] Avenue to transfer patient to [facility] per Hospitalist. Echo revealed  EF% 50%  and grade 2 diastolic dysfunction. D/w card surgery office & Dr Rachelle Coello.  2. Hypertension-stable reduced Norvasc and bb. continue ACEi  3. Tobacco abuse-Patient advised to stop smoking to reduce future cardiovascular events. 4. Hyperlipidemia- on statin. Right wrist insertion site no hematoma no s/s infection able to palpate radial pulse    SUMMARY: Echo 6/17  Left ventricle: Systolic function was at the lower limits of normal by EF  (biplane method of disks). Ejection fraction was estimated to be 50 %. There was of the basal inferolateral wall(s). Features were consistent  with a pseudonormal left ventricular filling pattern, with concomitant  abnormal relaxation and increased filling pressure (grade 2 diastolic  dysfunction). Left atrium: The atrium was mildly dilated. Mitral valve: There was trivial regurgitation. Aortic valve: There was trivial regurgitation. COMPARISONS:  There has been no significant change. Comparison was made with the  previous study of 13-Oct-2012.       ASSESSMENT:  Hospital Problems  Never Reviewed          Codes Class Noted POA    Angina at rest [facility]) ICD-10-CM: I20.8  ICD-9-CM: 413.9  6/10/2017 Unknown        Essential hypertension, benign ICD-10-CM: I10  ICD-9-CM: 401.1  Unknown Yes        Other and unspecified hyperlipidemia ICD-10-CM: E78.5  ICD-9-CM: 272.4  Unknown Yes        Personal history of tobacco use, presenting hazards to health ICD-10-CM: Z87.891  ICD-9-CM: V15.82  Unknown Yes    Overview Signed 4/1/2013  8:48 PM by Rivera Kim     Discussed cessation             Shortness of breath ICD-10-CM: R06.02  ICD-9-CM: 786.05  Unknown Yes    Overview Signed 4/1/2013  8:49 PM by Patricio Graff     Possible asthma  Mild MR, no clinical fluid overload                     SUBJECTIVE:    C/o intermittent SOB on exertion - relief by taking a single deep breath    OBJECTIVE:    VS:   Visit Vitals    /80 (BP 1 Location: Left arm, BP Patient Position: At rest)    Pulse (!) 54    Temp 97.4 °F (36.3 °C)    Resp 18    Ht 5' 6\" (1.676 m)    Wt 55.3 kg (122 lb)    SpO2 100%    BMI 19.69 kg/m2         Intake/Output Summary (Last 24 hours) at 06/14/17 0855  Last data filed at 06/14/17 0200   Gross per 24 hour   Intake           720.88 ml   Output              750 ml   Net           -29.12 ml     TELE: Sinus bradycardia     General: alert and in no apparent distress  HENT: Normocephalic, atraumatic. Normal external eye. Neck :  no JVD  Cardiac:  S1, S2 normal  Lungs: clear to auscultation bilaterally  Abdomen: Soft, nontender, no masses  Extremities:  No c/c/e, peripheral pulses present        Labs: Results:       Chemistry Recent Labs      06/14/17 0204 06/13/17   0046  06/12/17 0127   GLU  89  106*  99   NA  138  141  140   K  3.8  3.8  3.5   CL  103  105  107   CO2  30  31  28   BUN  15  19*  20*   CREA  0.66  0.69  0.69   CA  8.7  8.8  8.5   AGAP  5  5  5   BUCR  23*  28*  29*      CBC w/Diff Recent Labs      06/14/17 0204 06/12/17 0127   WBC  8.0  7.6   RBC  3.42*  3.36*   HGB  10.8*  10.4*   HCT  31.3*  31.0*   PLT  181  187   GRANS  40  33*   LYMPH  50  58*   EOS  3  2      Cardiac Enzymes No results for input(s): CPK, CKND1, SANTIAGO in the last 72 hours. No lab exists for component: CKRMB, TROIP   Coagulation Recent Labs      06/14/17   0207 06/13/17   1759   06/12/17 0127   PTP   --    --    --   12.6   INR   --    --    --   1.0   APTT  91.8*  88.6*   < >  80.0*    < > = values in this interval not displayed.        Lipid Panel Lab Results   Component Value Date/Time    Cholesterol, total 170 06/11/2017 04:06 AM    HDL Cholesterol 62 06/11/2017 04:06 AM    LDL, calculated 97 06/11/2017 04:06 AM    VLDL, calculated 11 06/11/2017 04:06 AM    Triglyceride 55 06/11/2017 04:06 AM    CHOL/HDL Ratio 2.7 06/11/2017 04:06 AM      BNP No results for input(s): BNPP in the last 72 hours. Liver Enzymes No results for input(s): TP, ALB, TBIL, AP, SGOT, GPT in the last 72 hours. No lab exists for component: DBIL   Thyroid Studies No results found for: T4, T3U, TSH, TSHEXT, TSHEXT           Marcella Shearer NP     Patient seen independently  Discussed the details with NP and patient.  Please see orders & recommendations  Lokesh Read MD

## 2017-06-14 NOTE — ROUTINE PROCESS
Attempted to get new IV sites due to date on current IV pt stuck x 1 then she refused to be stuck again

## 2017-06-14 NOTE — ROUTINE PROCESS
Bedside shift change report given to [facility], ICU RN (oncoming nurse) by Vanessa Gonsalves RN (offgoing nurse). Report included the following information SBAR, Kardex, Intake/Output, MAR, Recent Results, Med Rec Status and Cardiac Rhythm NSR.

## 2017-06-14 NOTE — DISCHARGE SUMMARY
[address] [facility] [address] SUMMARY    Name:  Jose Leal  MR#:  [id number]  :  1960  Account #:  [de-identified]  Date of Adm:  06/10/2017  Date of Transfer:  2017      DISCHARGE DIAGNOSES: Includes:  1. A 3-vessel coronary artery disease. 2. Hypertension. 3. Dyslipidemia. 4. Suspected underlying chronic obstructive pulmonary disease. 5. Ongoing tobacco abuse. 6. Mild mitral regurgitation. 7. Moderate protein-calorie malnutrition, and the patient is  underweight. HOSPITAL COURSE: This is a 81-year-old female who recently  presented to the ER with complaints of chest pain. The patient was  evaluated. Cardiac biomarkers were trended. Cardiology was  consulted. The patient subsequently underwent a cardiac  catheterization. The patient was noted to have a non-ST elevation MI. The patient underwent a cardiac catheterization which showed that the  patient had multi-vessel coronary artery disease. Cardiology  recommended a cardiothoracic surgical evaluation. Dr. Cecil Barrera from Cardiothoracic Surgery saw the patient. The patient  wishes to be transferred to [facility] for a cardiothoracic  surgical evaluation. After the cardiac catheterization the patient has  been continued on heparin infusion. The patient is also on aspirin and  statin and beta blocker and an ACE inhibitor. The patient has some  history of constipation and is on a bowel regimen. So earlier today the  cardiologist, Dr. Nita Moody, reached out to the cardiothoracic surgical  group at [facility] and requested a transfer. Subsequently, I called the cardiothoracic surgical group and spoke to  Reanna Arora and requested the transfer and a physician-to-physician  consultation with the hospitalist accepting the patient there. I have left  my cell phone number for call back. I am awaiting call back from now. So the patient remained hemodynamically stable. DISCHARGE MEDICATIONS: Include:  1.  Norvasc 2.5 mg p.o. daily. 2. Aspirin 325 mg p.o. daily. 3. Lipitor 20 mg p.o. daily. 4. Colace 100 mg p.o. twice daily. 5. Heparin infusion for ACS protocol. 6. Lisinopril 5 mg p.o. daily. 7. Protonix 40 mg p.o. daily. 8. MiraLax 17 g p.o. daily. 9. Multivitamin 1 tablet p.o. daily. 10. Lopressor 25 mg p.o. twice daily. 11. Albuterol 1 puff inhaled every 4 hours p.r.n. 12. Lexapro 10 mg p.o. daily. So the plan for transfer has been discussed with the patient. I also  discussed with the patient's sister at bedside yesterday. I also  counseled the patient regarding quitting tobacco use. The patient  verbalized understanding and agreed. So once we have a bed  available at [facility], the patient will be transferred  there. TIME SPENT: Total time for this transfer was more than 35 minutes.         Randee Rodríguez MD    VT / Bety Goff  D:  06/14/2017   11:09  T:  06/14/2017   11:33  Job #:  [id number]

## 2017-06-14 NOTE — PROGRESS NOTES
Care Management Interventions  PCP Verified by CM: Yes (AS LISTED)  Palliative Care Consult (Criteria: CHF and RRAT>21): No  Reason for No Palliative Care Consult: Other (see comment)  Mode of Transport at Discharge: Self  Transition of Care Consult (CM Consult): Discharge Planning  MyChart Signup: No  Discharge Durable Medical Equipment: No (NONE AVAILABLE, NO NEED VOICED)  Health Maintenance Reviewed: Yes  Physical Therapy Consult: No  Occupational Therapy Consult: No  Speech Therapy Consult: No  Current Support Network: Own Home (PT'S [employer]AND[facility] [address] [address]; PATIENT SELF CARE & INDEPENDENT)  Confirm Follow Up Transport: Self  Plan discussed with Pt/Family/Caregiver: Yes    Patient transferring to [facility] under EMTALA.    Med transport arranged with [facility],  time 1430, or 2:30 PM.

## 2017-06-14 NOTE — PROGRESS NOTES
Discussed transfer plans with patient and she verbalized understanding and agreed. Tried to call sister on patients request [phone], left message. Tried to call Uncle on patients request [phone], and left message. Also spoke to Beau Cabral the care manager regarding plans to transfer patient to [facility], she will be in to talk to patient soon. Also received call back from [facility] with Cardiothoracic surgery at [facility] and she advised me that Dr Patsy Becerril will be the accepting Hospitalist. I called and discussed the patients medical situation with Dr Patsy Becerril [phone] and he agrees to accept patient.

## 2017-06-20 ENCOUNTER — TELEPHONE (OUTPATIENT)
Dept: CARDIAC REHAB | Age: 57
End: 2017-06-20

## 2017-06-20 NOTE — TELEPHONE ENCOUNTER
Card[facility]hab called patient and spoke with her about the program. She is interested and states that she has Medicare as her primary insurance with no secondary coverage. She wants to think about it for a few days. Will follow up.     Thank you,  Thomas Bryant

## 2017-06-26 ENCOUNTER — TELEPHONE (OUTPATIENT)
Dept: CARDIAC REHAB | Age: 57
End: 2017-06-26

## 2017-07-05 ENCOUNTER — OFFICE VISIT (OUTPATIENT)
Dept: CARDIOLOGY CLINIC | Age: 57
End: 2017-07-05

## 2017-07-05 VITALS
BODY MASS INDEX: 18.32 KG/M2 | HEART RATE: 73 BPM | HEIGHT: 66 IN | WEIGHT: 114 LBS | SYSTOLIC BLOOD PRESSURE: 112 MMHG | DIASTOLIC BLOOD PRESSURE: 68 MMHG

## 2017-07-05 DIAGNOSIS — E78.5 OTHER AND UNSPECIFIED HYPERLIPIDEMIA: ICD-10-CM

## 2017-07-05 DIAGNOSIS — I10 ESSENTIAL HYPERTENSION, BENIGN: ICD-10-CM

## 2017-07-05 DIAGNOSIS — I25.10 CORONARY ARTERY DISEASE INVOLVING NATIVE CORONARY ARTERY OF NATIVE HEART WITHOUT ANGINA PECTORIS: Primary | ICD-10-CM

## 2017-07-05 DIAGNOSIS — Z95.1 S/P CABG X 3: ICD-10-CM

## 2017-07-05 RX ORDER — CARVEDILOL 6.25 MG/1
TABLET ORAL 2 TIMES DAILY WITH MEALS
COMMUNITY

## 2017-07-05 RX ORDER — ROSUVASTATIN CALCIUM 10 MG/1
10 TABLET, COATED ORAL
COMMUNITY

## 2017-07-05 RX ORDER — ASPIRIN 81 MG/1
TABLET ORAL DAILY
COMMUNITY

## 2017-07-05 RX ORDER — AMIODARONE HYDROCHLORIDE 200 MG/1
TABLET ORAL
COMMUNITY
End: 2017-07-05 | Stop reason: ALTCHOICE

## 2017-07-05 RX ORDER — TRAMADOL HYDROCHLORIDE 50 MG/1
50 TABLET ORAL
COMMUNITY
End: 2017-08-14

## 2017-07-05 RX ORDER — LANOLIN ALCOHOL/MO/W.PET/CERES
CREAM (GRAM) TOPICAL
COMMUNITY

## 2017-07-05 RX ORDER — CLOPIDOGREL BISULFATE 75 MG/1
TABLET ORAL
COMMUNITY
End: 2017-10-10

## 2017-07-05 NOTE — PROGRESS NOTES
HISTORY OF PRESENT ILLNESS  Carla Harvey is a 56 y.o. female. HPI Comments: PATIENT WITH RECENT ADMISSION WITH ACS  Has cad,post cabg-6/2017,htypelipidemia  Recovering post cabg,has drainage at upper stermal border-evaluated at surgical clinic-today    Hypertension   The history is provided by the patient. This is a chronic problem. The problem occurs constantly. The problem has not changed since onset. Associated symptoms include shortness of breath. Pertinent negatives include no chest pain, no abdominal pain and no headaches. Shortness of Breath   The history is provided by the patient. This is a chronic problem. The problem occurs intermittently. The current episode started more than 1 week ago. The problem has been gradually improving. Pertinent negatives include no fever, no headaches, no cough, no sputum production, no hemoptysis, no wheezing, no PND, no orthopnea, no chest pain, no vomiting, no abdominal pain, no rash, no leg swelling and no claudication. Review of Systems   Constitutional: Negative for chills and fever. HENT: Negative for nosebleeds. Eyes: Negative for blurred vision and double vision. Respiratory: Positive for shortness of breath. Negative for cough, hemoptysis, sputum production and wheezing. Cardiovascular: Negative for chest pain, palpitations, orthopnea, claudication, leg swelling and PND. Gastrointestinal: Negative for abdominal pain, heartburn, nausea and vomiting. Musculoskeletal: Negative for myalgias. Skin: Negative for rash. Neurological: Negative for dizziness, weakness and headaches. Endo/Heme/Allergies: Does not bruise/bleed easily.      Family History   Problem Relation Age of Onset    Heart Disease Other     Heart Surgery Other     Heart Attack Other        Past Medical History:   Diagnosis Date    Essential hypertension, benign     Hypercholesterolemia     Hypertension     Other and unspecified hyperlipidemia     Personal history of tobacco use, presenting hazards to health     Discussed cessation    Shortness of breath     Possible asthma Mild MR, no clinical fluid overload       Past Surgical History:   Procedure Laterality Date    CARDIAC CATHETERIZATION  6/12/2017         CORONARY ARTERY ANGIOGRAM  6/12/2017         HX ORTHOPAEDIC      foot surgery    LV ANGIOGRAPHY  6/12/2017         MODERATE SEDATION  6/12/2017            No Known Allergies    Current Outpatient Prescriptions   Medication Sig    clopidogrel (PLAVIX) 75 mg tab Take  by mouth.  aspirin delayed-release 81 mg tablet Take  by mouth daily.  rosuvastatin (CRESTOR) 10 mg tablet Take 10 mg by mouth nightly.  traMADol (ULTRAM) 50 mg tablet Take 50 mg by mouth every six (6) hours as needed for Pain.  carvedilol (COREG) 6.25 mg tablet Take  by mouth two (2) times daily (with meals).  ferrous sulfate 325 mg (65 mg iron) tablet Take  by mouth Daily (before breakfast).  sennosides (SENNA) 8.6 mg cap Take  by mouth.  polyethylene glycol (MIRALAX) 17 gram packet Take 1 Packet by mouth daily.  escitalopram oxalate (LEXAPRO) 10 mg tablet Take 10 mg by mouth daily.  albuterol (PROVENTIL HFA, VENTOLIN HFA) 90 mcg/actuation inhaler Take 1 Puff by inhalation every four (4) hours as needed. No current facility-administered medications for this visit. Visit Vitals    /68    Pulse 73    Ht 5' 6\" (1.676 m)    Wt 51.7 kg (114 lb)    BMI 18.4 kg/m2         Physical Exam   Constitutional: She is oriented to person, place, and time. She appears well-developed and well-nourished. HENT:   Head: Normocephalic and atraumatic. Eyes: Conjunctivae are normal.   Neck: Neck supple. No JVD present. No tracheal deviation present. No thyromegaly present. Cardiovascular: Normal rate and regular rhythm. PMI is not displaced. Exam reveals no gallop, no S3 and no decreased pulses. Murmur heard.    Holosystolic murmur is present with a grade of 2/6  at the apex  Pulmonary/Chest: No respiratory distress. She has no wheezes. She has no rales. She exhibits no tenderness. Abdominal: Soft. There is no tenderness. Musculoskeletal: She exhibits no edema. Neurological: She is alert and oriented to person, place, and time. Skin: Skin is warm. Psychiatric: She has a normal mood and affect. CARDIOLOGY STUDIES 10/1/2012   Myocardial Perfusion Scan Result probably normal   Echocardiogram - Complete Result normal EF, mild MR   Some recent data might be hidden   6/2017SUMMARY: Severe three-vessel CAD with excellent targets, and overall normal LV function. Would recommend consideration for coronary artery bypass graft surgery. Moderate sedation was utilized for 30 minutes using Versed and fentanyl under my supervision. SUMMARY:echo-6/2017  Left ventricle: Systolic function was at the lower limits of normal by EF  (biplane method of disks). Ejection fraction was estimated to be 50 %. There was of the basal inferolateral wall(s). Features were consistent  with a pseudonormal left ventricular filling pattern, with concomitant  abnormal relaxation and increased filling pressure (grade 2 diastolic  dysfunction). Left atrium: The atrium was mildly dilated. Mitral valve: There was trivial regurgitation. Aortic valve: There was trivial regurgitation. Operation Performed on 6/22/2017  1.   Coronary artery bypass grafting x3 with a left internal mammary artery to the left anterior descending, and a double sequential saphenous vein graft to the diagonal and then more distally to the ramus intermedius coronary artery (the distal branches of the right coronary artery were not large enough for grafting in this chronically occluded vessel). Assessment       ICD-10-CM ICD-9-CM    1. Coronary artery disease involving native coronary artery of native heart without angina pectoris I25.10 414.01     stable  post cath   2.  Essential hypertension, benign I10 401.1     controlled   3. Other and unspecified hyperlipidemia E78.5 272.4     stable   4. S/P CABG x 3 Z95.1 V45.81     Operation Performed on 6/22/2017  1.   Coronary artery bypass grafting x3 with a left internal mammary artery to the left anterior descending, and a double sequ       Stop amiodarone    No orders of the defined types were placed in this encounter. Follow-up Disposition:  Return in about 3 months (around 10/5/2017).

## 2017-07-05 NOTE — MR AVS SNAPSHOT
Visit Information Date & Time Provider Department Dept. Phone Encounter #  
 7/5/2017 11:00 AM Molly Sanchez MD [facility] [phone] [id number] Follow-up Instructions Return in about 3 months (around 10/5/2017). Upcoming Health Maintenance Date Due Hepatitis C Screening 1960 Pneumococcal 19-64 Medium Risk (1 of 1 - PPSV23) 6/20/1981 DTaP/Tdap/Td series (1 - Tdap) 6/20/1983 PAP AKA CERVICAL CYTOLOGY 6/20/1983 BREAST CANCER SCRN MAMMOGRAM 6/20/2012 FOBT Q 1 YEAR AGE 50-75 6/20/2012 INFLUENZA AGE 9 TO ADULT 8/1/2017 Allergies as of 7/5/2017  Review Complete On: 7/5/2017 By: Molly Sanchez MD  
 No Known Allergies Current Immunizations  Never Reviewed No immunizations on file. Not reviewed this visit You Were Diagnosed With   
  
 Codes Comments Coronary artery disease involving native coronary artery of native heart without angina pectoris    -  Primary ICD-10-CM: I25.10 ICD-9-CM: 414.01 stable 
post cath Essential hypertension, benign     ICD-10-CM: I10 
ICD-9-CM: 401.1 controlled Other and unspecified hyperlipidemia     ICD-10-CM: E78.5 ICD-9-CM: 272.4 stable S/P CABG x 3     ICD-10-CM: Z95.1 ICD-9-CM: V45.81 Operation Performed on 6/22/2017 1.   Coronary artery bypass grafting x3 with a left internal mammary artery to the left anterior descending, and a double sequ Vitals BP Pulse Height(growth percentile) Weight(growth percentile) BMI Smoking Status 112/68 73 5' 6\" (1.676 m) 114 lb (51.7 kg) 18.4 kg/m2 Former Smoker Vitals History BMI and BSA Data Body Mass Index Body Surface Area  
 18.4 kg/m 2 1.55 m 2 Your Updated Medication List  
  
   
This list is accurate as of: 7/5/17 11:25 AM.  Always use your most recent med list.  
  
  
  
  
 albuterol 90 mcg/actuation inhaler Commonly known as:  PROVENTIL HFA, VENTOLIN HFA, PROAIR HFA  
 Take 1 Puff by inhalation every four (4) hours as needed. aspirin delayed-release 81 mg tablet Take  by mouth daily. carvedilol 6.25 mg tablet Commonly known as:  Roxanna Nicholas Take  by mouth two (2) times daily (with meals). clopidogrel 75 mg Tab Commonly known as:  PLAVIX Take  by mouth. CRESTOR 10 mg tablet Generic drug:  rosuvastatin Take 10 mg by mouth nightly. ferrous sulfate 325 mg (65 mg iron) tablet Take  by mouth Daily (before breakfast). LEXAPRO 10 mg tablet Generic drug:  escitalopram oxalate Take 10 mg by mouth daily. polyethylene glycol 17 gram packet Commonly known as:  Belkis Cancel Take 1 Packet by mouth daily. Senna 8.6 mg Cap Generic drug:  sennosides Take  by mouth. traMADol 50 mg tablet Commonly known as:  ULTRAM  
Take 50 mg by mouth every six (6) hours as needed for Pain. Follow-up Instructions Return in about 3 months (around 10/5/2017). Introducing [facility]! Jacques Rocha introduces [employer] patient portal. Now you can access parts of your medical record, email your doctor's office, and request medication refills online. 1. In your internet browser, go to [email or web address] 2. Click on the First Time User? Click Here link in the Sign In box. You will see the New Member Sign Up page. 3. Enter your [employer] Access Code exactly as it appears below. You will not need to use this code after youve completed the sign-up process. If you do not sign up before the expiration date, you must request a new code. · [employer] Access Code: [email or web address] Expires: 9/11/2017  3:29 PM 
 
4. Enter the last four digits of your Social Security Number (xxxx) and Date of Birth (mm/dd/yyyy) as indicated and click Submit. You will be taken to the next sign-up page. 5. Create a [employer] ID.  This will be your [employer] login ID and cannot be changed, so think of one that is secure and easy to remember. 6. Create a [employer] password. You can change your password at any time. 7. Enter your Password Reset Question and Answer. This can be used at a later time if you forget your password. 8. Enter your e-mail address. You will receive e-mail notification when new information is available in [address] 9. Click Sign Up. You can now view and download portions of your medical record. 10. Click the Download Summary menu link to download a portable copy of your medical information. If you have questions, please visit the Frequently Asked Questions section of the [employer] website. Remember, [employer] is NOT to be used for urgent needs. For medical emergencies, dial 911. Now available from your iPhone and Android! Please provide this summary of care documentation to your next provider. Your primary care clinician is listed as [employer]. If you have any questions after today's visit, please call [phone].

## 2017-07-05 NOTE — LETTER
Valentine Xiong 1960 7/5/2017 Dear Quinten Flores, DO I had the pleasure of evaluating  Ms. Quinones in office today. Below are the relevant portions of my assessment and plan of care. ICD-10-CM ICD-9-CM 1. Coronary artery disease involving native coronary artery of native heart without angina pectoris I25.10 414.01   
 stable 
post cath 2. Essential hypertension, benign I10 401.1   
 controlled 3. Other and unspecified hyperlipidemia E78.5 272.4   
 stable 4. S/P CABG x 3 Z95.1 V45.81 Operation Performed on 6/22/2017 1.   Coronary artery bypass grafting x3 with a left internal mammary artery to the left anterior descending, and a double sequ Current Outpatient Prescriptions Medication Sig Dispense Refill  clopidogrel (PLAVIX) 75 mg tab Take  by mouth.  aspirin delayed-release 81 mg tablet Take  by mouth daily.  rosuvastatin (CRESTOR) 10 mg tablet Take 10 mg by mouth nightly.  traMADol (ULTRAM) 50 mg tablet Take 50 mg by mouth every six (6) hours as needed for Pain.  carvedilol (COREG) 6.25 mg tablet Take  by mouth two (2) times daily (with meals).  ferrous sulfate 325 mg (65 mg iron) tablet Take  by mouth Daily (before breakfast).  sennosides (SENNA) 8.6 mg cap Take  by mouth.  polyethylene glycol (MIRALAX) 17 gram packet Take 1 Packet by mouth daily. 30 Packet 0  
 escitalopram oxalate (LEXAPRO) 10 mg tablet Take 10 mg by mouth daily.  albuterol (PROVENTIL HFA, VENTOLIN HFA) 90 mcg/actuation inhaler Take 1 Puff by inhalation every four (4) hours as needed. Orders Placed This Encounter  clopidogrel (PLAVIX) 75 mg tab Sig: Take  by mouth.  aspirin delayed-release 81 mg tablet Sig: Take  by mouth daily.  rosuvastatin (CRESTOR) 10 mg tablet Sig: Take 10 mg by mouth nightly.  traMADol (ULTRAM) 50 mg tablet Sig: Take 50 mg by mouth every six (6) hours as needed for Pain.  DISCONTD: amiodarone (CORDARONE) 200 mg tablet Sig: Take  by mouth.  carvedilol (COREG) 6.25 mg tablet Sig: Take  by mouth two (2) times daily (with meals).  ferrous sulfate 325 mg (65 mg iron) tablet Sig: Take  by mouth Daily (before breakfast).  sennosides (SENNA) 8.6 mg cap Sig: Take  by mouth. If you have questions, please do not hesitate to call me. I look forward to following Ms. Quinones along with you. Sincerely, Venessa Lyons MD

## 2017-08-14 ENCOUNTER — OFFICE VISIT (OUTPATIENT)
Dept: CARDIOLOGY CLINIC | Age: 57
End: 2017-08-14

## 2017-08-14 VITALS
HEART RATE: 71 BPM | BODY MASS INDEX: 18.32 KG/M2 | DIASTOLIC BLOOD PRESSURE: 71 MMHG | WEIGHT: 114 LBS | SYSTOLIC BLOOD PRESSURE: 114 MMHG | HEIGHT: 66 IN

## 2017-08-14 DIAGNOSIS — Z95.1 S/P CABG X 3: ICD-10-CM

## 2017-08-14 DIAGNOSIS — E78.5 OTHER AND UNSPECIFIED HYPERLIPIDEMIA: ICD-10-CM

## 2017-08-14 DIAGNOSIS — I10 ESSENTIAL HYPERTENSION, BENIGN: ICD-10-CM

## 2017-08-14 DIAGNOSIS — I25.10 CORONARY ARTERY DISEASE INVOLVING NATIVE CORONARY ARTERY OF NATIVE HEART WITHOUT ANGINA PECTORIS: Primary | ICD-10-CM

## 2017-08-14 NOTE — PROGRESS NOTES
1. Have you been to the ER, urgent care clinic since your last visit? Hospitalized since your last visit? No    2. Have you seen or consulted any other health care providers outside of the [address] [place] [address] since your last visit? Include any pap smears or colon screening. Yes Where: PCP     3. Since your last visit, have you had any of the following symptoms? chest pains and shortness of breath. 4.  Have you had any blood work, X-rays or cardiac testing? Yes Where: PCP Office             5.  Where do you normally have your labs drawn? PCP Office    6. Do you need any refills today?    No      :

## 2017-08-14 NOTE — LETTER
Rell Oneil 1960 8/14/2017 Dear Tamara Johnson, DO I had the pleasure of evaluating  Ms. Quinones in office today. Below are the relevant portions of my assessment and plan of care. ICD-10-CM ICD-9-CM 1. Coronary artery disease involving native coronary artery of native heart without angina pectoris I25.10 414.01   
 stable 
occasional atypical angina 2. Essential hypertension, benign I10 401.1   
 stable 3. Other and unspecified hyperlipidemia E78.5 272.4   
 on statin 4. S/P CABG x 3 Z95.1 V45.81   
 rca not bypassable 100% occluded Current Outpatient Prescriptions Medication Sig Dispense Refill  clopidogrel (PLAVIX) 75 mg tab Take  by mouth.  aspirin delayed-release 81 mg tablet Take  by mouth daily.  rosuvastatin (CRESTOR) 10 mg tablet Take 10 mg by mouth nightly.  carvedilol (COREG) 6.25 mg tablet Take  by mouth two (2) times daily (with meals).  ferrous sulfate 325 mg (65 mg iron) tablet Take  by mouth Daily (before breakfast).  sennosides (SENNA) 8.6 mg cap Take  by mouth.  escitalopram oxalate (LEXAPRO) 10 mg tablet Take 10 mg by mouth daily.  albuterol (PROVENTIL HFA, VENTOLIN HFA) 90 mcg/actuation inhaler Take 1 Puff by inhalation every four (4) hours as needed. No orders of the defined types were placed in this encounter. If you have questions, please do not hesitate to call me. I look forward to following Ms. Quinones along with you. Sincerely, Kallie Brice MD

## 2017-08-14 NOTE — PROGRESS NOTES
HISTORY OF PRESENT ILLNESS  Jurgen Torres is a 56 y.o. female. HPI Comments: PATIENT WITH RECENT ADMISSION WITH ACS  Has cad,post cabg-6/2017,htypelipidemia  Recovering post cabg,has occasional sharp pains-clinically non a angina    Shortness of Breath   The history is provided by the patient. This is a chronic problem. The problem occurs intermittently. The current episode started more than 1 week ago. The problem has been gradually improving. Pertinent negatives include no fever, no headaches, no cough, no sputum production, no hemoptysis, no wheezing, no PND, no orthopnea, no chest pain, no vomiting, no abdominal pain, no rash, no leg swelling and no claudication. Hypertension   The history is provided by the patient. This is a chronic problem. The problem occurs constantly. The problem has not changed since onset. Associated symptoms include shortness of breath. Pertinent negatives include no chest pain, no abdominal pain and no headaches. Review of Systems   Constitutional: Negative for chills and fever. HENT: Negative for nosebleeds. Eyes: Negative for blurred vision and double vision. Respiratory: Positive for shortness of breath. Negative for cough, hemoptysis, sputum production and wheezing. Cardiovascular: Negative for chest pain, palpitations, orthopnea, claudication, leg swelling and PND. Gastrointestinal: Negative for abdominal pain, heartburn, nausea and vomiting. Musculoskeletal: Negative for myalgias. Skin: Negative for rash. Neurological: Negative for dizziness, weakness and headaches. Endo/Heme/Allergies: Does not bruise/bleed easily.      Family History   Problem Relation Age of Onset    Heart Disease Other     Heart Surgery Other     Heart Attack Other        Past Medical History:   Diagnosis Date    Essential hypertension, benign     Hypercholesterolemia     Hypertension     Other and unspecified hyperlipidemia     Personal history of tobacco use, presenting hazards to health     Discussed cessation    Shortness of breath     Possible asthma Mild MR, no clinical fluid overload       Past Surgical History:   Procedure Laterality Date    CARDIAC CATHETERIZATION  6/12/2017         CORONARY ARTERY ANGIOGRAM  6/12/2017         HX ORTHOPAEDIC      foot surgery    LV ANGIOGRAPHY  6/12/2017         MODERATE SEDATION  6/12/2017            No Known Allergies    Current Outpatient Prescriptions   Medication Sig    clopidogrel (PLAVIX) 75 mg tab Take  by mouth.  aspirin delayed-release 81 mg tablet Take  by mouth daily.  rosuvastatin (CRESTOR) 10 mg tablet Take 10 mg by mouth nightly.  carvedilol (COREG) 6.25 mg tablet Take  by mouth two (2) times daily (with meals).  ferrous sulfate 325 mg (65 mg iron) tablet Take  by mouth Daily (before breakfast).  sennosides (SENNA) 8.6 mg cap Take  by mouth.  escitalopram oxalate (LEXAPRO) 10 mg tablet Take 10 mg by mouth daily.  albuterol (PROVENTIL HFA, VENTOLIN HFA) 90 mcg/actuation inhaler Take 1 Puff by inhalation every four (4) hours as needed. No current facility-administered medications for this visit. Visit Vitals    /71    Pulse 71    Ht 5' 6\" (1.676 m)    Wt 51.7 kg (114 lb)    BMI 18.4 kg/m2         Physical Exam   Constitutional: She is oriented to person, place, and time. She appears well-developed and well-nourished. HENT:   Head: Normocephalic and atraumatic. Eyes: Conjunctivae are normal.   Neck: Neck supple. No JVD present. No tracheal deviation present. No thyromegaly present. Cardiovascular: Normal rate and regular rhythm. PMI is not displaced. Exam reveals no gallop, no S3 and no decreased pulses. Murmur heard. Holosystolic murmur is present with a grade of 2/6  at the apex  Pulmonary/Chest: No respiratory distress. She has no wheezes. She has no rales. She exhibits no tenderness. Abdominal: Soft. There is no tenderness.    Musculoskeletal: She exhibits no edema. Neurological: She is alert and oriented to person, place, and time. Skin: Skin is warm. Psychiatric: She has a normal mood and affect. CARDIOLOGY STUDIES 10/1/2012   Myocardial Perfusion Scan Result probably normal   Echocardiogram - Complete Result normal EF, mild MR   Some recent data might be hidden   6/2017SUMMARY: Severe three-vessel CAD with excellent targets, and overall normal LV function. Would recommend consideration for coronary artery bypass graft surgery. Moderate sedation was utilized for 30 minutes using Versed and fentanyl under my supervision. SUMMARY:echo-6/2017  Left ventricle: Systolic function was at the lower limits of normal by EF  (biplane method of disks). Ejection fraction was estimated to be 50 %. There was of the basal inferolateral wall(s). Features were consistent  with a pseudonormal left ventricular filling pattern, with concomitant  abnormal relaxation and increased filling pressure (grade 2 diastolic  dysfunction). Left atrium: The atrium was mildly dilated. Mitral valve: There was trivial regurgitation. Aortic valve: There was trivial regurgitation. Operation Performed on 6/22/2017  1.   Coronary artery bypass grafting x3 with a left internal mammary artery to the left anterior descending, and a double sequential saphenous vein graft to the diagonal and then more distally to the ramus intermedius coronary artery (the distal branches of the right coronary artery were not large enough for grafting in this chronically occluded vessel). · Right coronary: Mid 100% occlusion with distal filling via collaterals from the left coronary system. (not bypasses)  Assessment       ICD-10-CM ICD-9-CM    1. Coronary artery disease involving native coronary artery of native heart without angina pectoris I25.10 414.01     stable  occasional atypical angina   2. Essential hypertension, benign I10 401.1     stable   3.  Other and unspecified hyperlipidemia E78.5 272.4     on statin   4. S/P CABG x 3 Z95.1 V45.81     rca not bypassable  100% occluded         No orders of the defined types were placed in this encounter. Follow-up Disposition:  Return for old f/u.

## 2017-08-14 NOTE — MR AVS SNAPSHOT
Visit Information Date & Time Provider Department Dept. Phone Encounter #  
 8/14/2017 12:00 PM Allen Thomas MD [facility] [phone] [id number] Follow-up Instructions Return for old f/u. Your Appointments 10/3/2017  9:15 AM  
Follow Up with Allen Thomas MD  
[facility] ([address]) Appt Note: 3 month follow up  
 [address] [facility] [address]  
  
   
 [address] [place] [id number] Upcoming Health Maintenance Date Due Hepatitis C Screening 1960 DTaP/Tdap/Td series (1 - Tdap) 6/20/1983 PAP AKA CERVICAL CYTOLOGY 6/20/1983 BREAST CANCER SCRN MAMMOGRAM 6/20/2012 FOBT Q 1 YEAR AGE 50-75 6/20/2012 INFLUENZA AGE 9 TO ADULT 8/1/2017 Allergies as of 8/14/2017  Review Complete On: 8/14/2017 By: Allen Thomas MD  
 No Known Allergies Current Immunizations  Never Reviewed No immunizations on file. Not reviewed this visit You Were Diagnosed With   
  
 Codes Comments Coronary artery disease involving native coronary artery of native heart without angina pectoris    -  Primary ICD-10-CM: I25.10 ICD-9-CM: 414.01 stable 
occasional atypical angina Essential hypertension, benign     ICD-10-CM: I10 
ICD-9-CM: 401.1 stable Other and unspecified hyperlipidemia     ICD-10-CM: E78.5 ICD-9-CM: 272.4 on statin S/P CABG x 3     ICD-10-CM: Z95.1 ICD-9-CM: V45.81 rca not bypassable 100% occluded Vitals BP Pulse Height(growth percentile) Weight(growth percentile) BMI Smoking Status 114/71 71 5' 6\" (1.676 m) 114 lb (51.7 kg) 18.4 kg/m2 Former Smoker Vitals History BMI and BSA Data Body Mass Index Body Surface Area  
 18.4 kg/m 2 1.55 m 2 Your Updated Medication List  
  
   
This list is accurate as of: 8/14/17 12:28 PM.  Always use your most recent med list.  
  
  
  
  
 albuterol 90 mcg/actuation inhaler Commonly known as:  PROVENTIL HFA, VENTOLIN HFA, PROAIR HFA Take 1 Puff by inhalation every four (4) hours as needed. aspirin delayed-release 81 mg tablet Take  by mouth daily. carvedilol 6.25 mg tablet Commonly known as:  Deleta Benito Take  by mouth two (2) times daily (with meals). clopidogrel 75 mg Tab Commonly known as:  PLAVIX Take  by mouth. CRESTOR 10 mg tablet Generic drug:  rosuvastatin Take 10 mg by mouth nightly. ferrous sulfate 325 mg (65 mg iron) tablet Take  by mouth Daily (before breakfast). LEXAPRO 10 mg tablet Generic drug:  escitalopram oxalate Take 10 mg by mouth daily. Senna 8.6 mg Cap Generic drug:  sennosides Take  by mouth. Follow-up Instructions Return for old f/u. Introducing [facility] SERVICES! Jose Wilder introduces [employer] patient portal. Now you can access parts of your medical record, email your doctor's office, and request medication refills online. 1. In your internet browser, go to [email or web address] 2. Click on the First Time User? Click Here link in the Sign In box. You will see the New Member Sign Up page. 3. Enter your [employer] Access Code exactly as it appears below. You will not need to use this code after youve completed the sign-up process. If you do not sign up before the expiration date, you must request a new code. · [employer] Access Code: [email or web address] Expires: 9/11/2017  3:29 PM 
 
4. Enter the last four digits of your Social Security Number (xxxx) and Date of Birth (mm/dd/yyyy) as indicated and click Submit. You will be taken to the next sign-up page. 5. Create a CiraNova ID. This will be your CiraNova login ID and cannot be changed, so think of one that is secure and easy to remember. 6. Create a Cir[employer]Nova password. You can change your password at any time. 7. Enter your Password Reset Question and Answer.  This can be used at a later time if you forget your password. 8. Enter your e-mail address. You will receive e-mail notification when new information is available in [address] 9. Click Sign Up. You can now view and download portions of your medical record. 10. Click the Download Summary menu link to download a portable copy of your medical information. If you have questions, please visit the Frequently Asked Questions section of the [employer] website. Remember, [employer] is NOT to be used for urgent needs. For medical emergencies, dial 911. Now available from your iPhone and Android! Please provide this summary of care documentation to your next provider. Your primary care clinician is listed as [employer]. If you have any questions after today's visit, please call [phone].

## 2017-09-19 ENCOUNTER — OFFICE VISIT (OUTPATIENT)
Dept: CARDIOLOGY CLINIC | Age: 57
End: 2017-09-19

## 2017-09-19 VITALS
BODY MASS INDEX: 18.8 KG/M2 | SYSTOLIC BLOOD PRESSURE: 112 MMHG | DIASTOLIC BLOOD PRESSURE: 65 MMHG | HEIGHT: 66 IN | HEART RATE: 79 BPM | WEIGHT: 117 LBS

## 2017-09-19 DIAGNOSIS — Z95.1 S/P CABG X 3: ICD-10-CM

## 2017-09-19 DIAGNOSIS — R07.9 CHEST PAIN, UNSPECIFIED TYPE: ICD-10-CM

## 2017-09-19 DIAGNOSIS — I25.10 CORONARY ARTERY DISEASE INVOLVING NATIVE CORONARY ARTERY OF NATIVE HEART WITHOUT ANGINA PECTORIS: Primary | ICD-10-CM

## 2017-09-19 DIAGNOSIS — R13.10 DYSPHAGIA, UNSPECIFIED TYPE: ICD-10-CM

## 2017-09-19 DIAGNOSIS — I10 ESSENTIAL HYPERTENSION, BENIGN: ICD-10-CM

## 2017-09-19 RX ORDER — ISOSORBIDE MONONITRATE 30 MG/1
30 TABLET, EXTENDED RELEASE ORAL DAILY
Qty: 30 TAB | Refills: 6 | Status: SHIPPED | OUTPATIENT
Start: 2017-09-19 | End: 2017-09-22 | Stop reason: SDUPTHER

## 2017-09-19 RX ORDER — ISOSORBIDE MONONITRATE 30 MG/1
TABLET, EXTENDED RELEASE ORAL DAILY
COMMUNITY
End: 2017-09-19 | Stop reason: SDUPTHER

## 2017-09-19 RX ORDER — ISOSORBIDE MONONITRATE 30 MG/1
30 TABLET, EXTENDED RELEASE ORAL DAILY
Qty: 30 TAB | Refills: 6 | Status: SHIPPED | OUTPATIENT
Start: 2017-09-19 | End: 2017-09-19

## 2017-09-19 NOTE — PROGRESS NOTES
1. Have you been to the ER, urgent care clinic since your last visit? Hospitalized since your last visit? Yes Where: [facility]Shortness of breath    2. Have you seen or consulted any other health care providers outside of the [address] [place] [address] since your last visit? Include any pap smears or colon screening. Yes Where: PCP     3. Since your last visit, have you had any of the following symptoms? chest pains and shortness of breath. 4.  Have you had any blood work, X-rays or cardiac testing? Yes Where: [facility] Reason for visit: Labs               5.  Where do you normally have your labs drawn? [facility]    6. Do you need any refills today?    No

## 2017-09-19 NOTE — PATIENT INSTRUCTIONS
Faxed records and demo's to  [facility] office for them to contact patient for appointment.

## 2017-09-19 NOTE — MR AVS SNAPSHOT
Visit Information Date & Time Provider Department Dept. Phone Encounter #  
 9/19/2017  1:00 PM Gerardo Aguirre MD [facility] [id number] [phone] Follow-up Instructions Return in about 6 weeks (around 10/31/2017). Your Appointments 9/28/2017  7:30 AM  
PROCEDURE with [place] NUC [facility] ([address]) Appt Note: Rhiannon/Dolan [address] [facility] [address]  
  
   
 [address] [facility]  
  
    
 10/26/2017 10:45 AM  
Follow Up with Gerardo Aguirre MD  
[facility] ([address]) Appt Note: 6 week post nuclear follow up  
 [address] [facility] [address]  
  
   
 [address] [place] [id number] Upcoming Health Maintenance Date Due Hepatitis C Screening 1960 DTaP/Tdap/Td series (1 - Tdap) 6/20/1983 PAP AKA CERVICAL CYTOLOGY 6/20/1983 BREAST CANCER SCRN MAMMOGRAM 6/20/2012 FOBT Q 1 YEAR AGE 50-75 6/20/2012 INFLUENZA AGE 9 TO ADULT 8/1/2017 Allergies as of 9/19/2017  Review Complete On: 9/19/2017 By: Gerardo Aguirre MD  
 No Known Allergies Current Immunizations  Never Reviewed No immunizations on file. Not reviewed this visit You Were Diagnosed With   
  
 Codes Comments Coronary artery disease involving native coronary artery of native heart without angina pectoris    -  Primary ICD-10-CM: I25.10 ICD-9-CM: 414.01 stable S/P CABG x 3     ICD-10-CM: Z95.1 ICD-9-CM: V45.81 Essential hypertension, benign     ICD-10-CM: I10 
ICD-9-CM: 401.1 controlled Dysphagia, unspecified type     ICD-10-CM: R13.10 ICD-9-CM: 787.20 needs gi evaluation Chest pain, unspecified type     ICD-10-CM: R07.9 ICD-9-CM: 786.50 typical 
? muscular 
? gi 
? angina Vitals BP Pulse Height(growth percentile) Weight(growth percentile) BMI Smoking Status 112/65 79 5' 6\" (1.676 m) 117 lb (53.1 kg) 18.88 kg/m2 Former Smoker Vitals History BMI and BSA Data Body Mass Index Body Surface Area  
 18.88 kg/m 2 1.57 m 2 Preferred Pharmacy Pharmacy Name Phone [id number] - [address], [address] [phone] Your Updated Medication List  
  
   
This list is accurate as of: 9/19/17  1:21 PM.  Always use your most recent med list.  
  
  
  
  
 albuterol 90 mcg/actuation inhaler Commonly known as:  PROVENTIL HFA, VENTOLIN HFA, PROAIR HFA Take 1 Puff by inhalation every four (4) hours as needed. aspirin delayed-release 81 mg tablet Take  by mouth daily. carvedilol 6.25 mg tablet Commonly known as:  Meng Morocho Take  by mouth two (2) times daily (with meals). clopidogrel 75 mg Tab Commonly known as:  PLAVIX Take  by mouth. CRESTOR 10 mg tablet Generic drug:  rosuvastatin Take 10 mg by mouth nightly. ferrous sulfate 325 mg (65 mg iron) tablet Take  by mouth Daily (before breakfast). isosorbide mononitrate ER 30 mg tablet Commonly known as:  IMDUR Take 1 Tab by mouth daily. Senna 8.6 mg Cap Generic drug:  sennosides Take  by mouth. SPIRIVA RESPIMAT 1.25 mcg/actuation inhaler Generic drug:  tiotropium bromide Take 2 Puffs by inhalation daily. Prescriptions Sent to Pharmacy Refills  
 isosorbide mononitrate ER (IMDUR) 30 mg tablet 6 Sig: Take 1 Tab by mouth daily. Class: Normal  
 Pharmacy: [address] #: [phone] Route: Oral  
  
We Performed the Following REFERRAL TO GASTROENTEROLOGY [[facility]] Comments:  
 Please evaluate patient for dysphagia. Follow-up Instructions Return in about 6 weeks (around 10/31/2017). To-Do List   
 09/26/2017 Nursing:  SCHEDULE NUCLEAR STUDY Referral Information Referral ID Referred By Referred To  
  
 [id number] Josiah Ca, [address] [facility] [address] Phone: [phone] Fax: [phone] Visits Status Start Date End Date 1 New Request 9/19/17 9/19/18 If your referral has a status of pending review or denied, additional information will be sent to support the outcome of this decision. Introducing [facility] HE[facility] SERVICES! [employer] introduces [employer] patient portal. Now you can access parts of your medical record, email your doctor's office, and request medication refills online. 1. In your internet browser, go to [email or web address] 2. Click on the First Time User? Click Here link in the Sign In box. You will see the New Member Sign Up page. 3. Enter your [employer] Access Code exactly as it appears below. You will not need to use this code after youve completed the sign-up process. If you do not sign up before the expiration date, you must request a new code. · [employer] Access Code: [email or web address] Expires: 12/18/2017 12:37 PM 
 
4. Enter the last four digits of your Social Security Number (xxxx) and Date of Birth (mm/dd/yyyy) as indicated and click Submit. You will be taken to the next sign-up page. 5. Create a [employer] ID. This will be your [employer] login ID and cannot be changed, so think of one that is secure and easy to remember. 6. Create a [employer] password. You can change your password at any time. 7. Enter your Password Reset Question and Answer. This can be used at a later time if you forget your password. 8. Enter your e-mail address. You will receive e-mail notification when new information is available in [address] 9. Click Sign Up. You can now view and download portions of your medical record. 10. Click the Download Summary menu link to download a portable copy of your medical information. If you have questions, please visit the Frequently Asked Questions section of the [employer] website. Remember, [facility] EHR is NOT to be used for urgent needs. For medical emergencies, dial 911. Now available from your iPhone and Android! Please provide this summary of care documentation to your next provider. Your primary care clinician is listed as [employer]. If you have any questions after today's visit, please call [phone].

## 2017-09-19 NOTE — PROGRESS NOTES
HISTORY OF PRESENT ILLNESS  Montana Palma is a 56 y.o. female. HPI Comments:   Has cad,post cabg-6/2017,htypelipidemia  Recovering post cabg,has occasional sharp pains-clinically non a angina  Recent admission with atypical chest pains  Now c/o pain after eating with nausea and vomiting,also has weight loss    Hospital Follow Up   Associated symptoms include chest pain and shortness of breath. Pertinent negatives include no abdominal pain and no headaches. Shortness of Breath   The history is provided by the patient. This is a chronic problem. The problem occurs intermittently. The current episode started more than 1 week ago. The problem has been gradually improving. Associated symptoms include chest pain. Pertinent negatives include no fever, no headaches, no cough, no sputum production, no hemoptysis, no wheezing, no PND, no orthopnea, no vomiting, no abdominal pain, no rash, no leg swelling and no claudication. Chest Pain (Angina)    Associated symptoms include shortness of breath. Pertinent negatives include no abdominal pain, no claudication, no cough, no dizziness, no fever, no headaches, no hemoptysis, no nausea, no orthopnea, no palpitations, no PND, no sputum production, no vomiting and no weakness. Hypertension   The history is provided by the patient. This is a chronic problem. The problem occurs constantly. The problem has not changed since onset. Associated symptoms include chest pain and shortness of breath. Pertinent negatives include no abdominal pain and no headaches. Review of Systems   Constitutional: Negative for chills and fever. HENT: Negative for nosebleeds. Eyes: Negative for blurred vision and double vision. Respiratory: Positive for shortness of breath. Negative for cough, hemoptysis, sputum production and wheezing. Cardiovascular: Positive for chest pain. Negative for palpitations, orthopnea, claudication, leg swelling and PND.    Gastrointestinal: Negative for abdominal pain, heartburn, nausea and vomiting. Musculoskeletal: Negative for myalgias. Skin: Negative for rash. Neurological: Negative for dizziness, weakness and headaches. Endo/Heme/Allergies: Does not bruise/bleed easily. Family History   Problem Relation Age of Onset    Heart Disease Other     Heart Surgery Other     Heart Attack Other        Past Medical History:   Diagnosis Date    Essential hypertension, benign     Hypercholesterolemia     Hypertension     Other and unspecified hyperlipidemia     Personal history of tobacco use, presenting hazards to health     Discussed cessation    Shortness of breath     Possible asthma Mild MR, no clinical fluid overload       Past Surgical History:   Procedure Laterality Date    CARDIAC CATHETERIZATION  6/12/2017         CORONARY ARTERY ANGIOGRAM  6/12/2017         HX ORTHOPAEDIC      foot surgery    LV ANGIOGRAPHY  6/12/2017         MODERATE SEDATION  6/12/2017            No Known Allergies    Current Outpatient Prescriptions   Medication Sig    tiotropium bromide (SPIRIVA RESPIMAT) 1.25 mcg/actuation inhaler Take 2 Puffs by inhalation daily.  isosorbide mononitrate ER (IMDUR) 30 mg tablet Take 1 Tab by mouth daily.  clopidogrel (PLAVIX) 75 mg tab Take  by mouth.  aspirin delayed-release 81 mg tablet Take  by mouth daily.  rosuvastatin (CRESTOR) 10 mg tablet Take 10 mg by mouth nightly.  carvedilol (COREG) 6.25 mg tablet Take  by mouth two (2) times daily (with meals).  ferrous sulfate 325 mg (65 mg iron) tablet Take  by mouth Daily (before breakfast).  sennosides (SENNA) 8.6 mg cap Take  by mouth.  albuterol (PROVENTIL HFA, VENTOLIN HFA) 90 mcg/actuation inhaler Take 1 Puff by inhalation every four (4) hours as needed. No current facility-administered medications for this visit.         Visit Vitals    /65    Pulse 79    Ht 5' 6\" (1.676 m)    Wt 53.1 kg (117 lb)    BMI 18.88 kg/m2         Physical Exam Constitutional: She is oriented to person, place, and time. She appears well-developed and well-nourished. HENT:   Head: Normocephalic and atraumatic. Eyes: Conjunctivae are normal.   Neck: Neck supple. No JVD present. No tracheal deviation present. No thyromegaly present. Cardiovascular: Normal rate and regular rhythm. PMI is not displaced. Exam reveals no gallop, no S3 and no decreased pulses. Murmur heard. Holosystolic murmur is present with a grade of 2/6  at the apex  Pulmonary/Chest: No respiratory distress. She has no wheezes. She has no rales. She exhibits no tenderness. Abdominal: Soft. There is no tenderness. Musculoskeletal: She exhibits no edema. Neurological: She is alert and oriented to person, place, and time. Skin: Skin is warm. Psychiatric: She has a normal mood and affect. CARDIOLOGY STUDIES 10/1/2012   Myocardial Perfusion Scan Result probably normal   Echocardiogram - Complete Result normal EF, mild MR   Some recent data might be hidden   6/2017SUMMARY: Severe three-vessel CAD with excellent targets, and overall normal LV function. Would recommend consideration for coronary artery bypass graft surgery. Moderate sedation was utilized for 30 minutes using Versed and fentanyl under my supervision. SUMMARY:echo-6/2017  Left ventricle: Systolic function was at the lower limits of normal by EF  (biplane method of disks). Ejection fraction was estimated to be 50 %. There was of the basal inferolateral wall(s). Features were consistent  with a pseudonormal left ventricular filling pattern, with concomitant  abnormal relaxation and increased filling pressure (grade 2 diastolic  dysfunction). Left atrium: The atrium was mildly dilated. Mitral valve: There was trivial regurgitation. Aortic valve: There was trivial regurgitation.       Operation Performed on 6/22/2017  1.   Coronary artery bypass grafting x3 with a left internal mammary artery to the left anterior descending, and a double sequential saphenous vein graft to the diagonal and then more distally to the ramus intermedius coronary artery (the distal branches of the right coronary artery were not large enough for grafting in this chronically occluded vessel). · Right coronary: Mid 100% occlusion with distal filling via collaterals from the left coronary system. (not bypasses)      LIMITED ECHOCARDIOGRAM.8/2017   HYPOKINESIS OF THE BASAL INFEROSEPTAL WALL WITH PRESERVED LEFT VENTRICULAR SYSTOLIC   FUNCTION. EJECTION FRACTION IS VISUALLY ESTIMATED AT 60%. GRADE I (MILD) DIASTOLIC DYSFUNCTION. MILD HYPERTROPHY OF THE LEFT VENTRICULAR POSTERIOR WALL. REDUCED RIGHT VENTRICULAR GLOBAL SYSTOLIC FUNCTION. MILDLY SCLEROTIC TRILEAFLET AORTIC VALVE WITHOUT EVIDENCE OF REDUCED EXCURSION. MILD AORTIC REGURGITATION. PULMONARY ARTERY PRESSURE WAS NOT ASSESSED ON THIS LIMITED STUDY. TRIVIAL PULMONIC REGURGITATION. NO SIGNIFICANT CHANGES FROM PRIOR ECHO REPORT ON 6/16/2017. Assessment       ICD-10-CM ICD-9-CM    1. Coronary artery disease involving native coronary artery of native heart without angina pectoris I25.10 414.01 SCHEDULE NUCLEAR STUDY    stable   2. S/P CABG x 3 Z95.1 V45.81 SCHEDULE NUCLEAR STUDY   3. Essential hypertension, benign I10 401.1     controlled   4. Dysphagia, unspecified type R13.10 787.20 REFERRAL TO GASTROENTEROLOGY    needs gi evaluation   5.  Chest pain, unspecified type R07.9 786.50 REFERRAL TO GASTROENTEROLOGY      SCHEDULE NUCLEAR STUDY    typical  ? muscular  ? gi  ? angina         Orders Placed This Encounter    REFERRAL TO GASTROENTEROLOGY     Referral Priority:   Routine     Referral Type:   Consultation     Referral Reason:   Specialty Services Required     Referred to Provider:   Priti Rios MD     Requested Specialty:   Gastroenterology    SCHEDULE NUCLEAR STUDY     lexiscan stress test     Standing Status:   Future     Standing Expiration Date:   9/19/2018   [facility] DISCONTD: isosorbide mononitrate ER (IMDUR) 30 mg tablet     Sig: Take 1 Tab by mouth daily. Dispense:  30 Tab     Refill:  6    isosorbide mononitrate ER (IMDUR) 30 mg tablet     Sig: Take 1 Tab by mouth daily. Dispense:  30 Tab     Refill:  6       Follow-up Disposition:  Return in about 6 weeks (around 10/31/2017).

## 2017-09-22 DIAGNOSIS — I25.10 ATHEROSCLEROSIS OF NATIVE CORONARY ARTERY OF NATIVE HEART WITHOUT ANGINA PECTORIS: Primary | ICD-10-CM

## 2017-09-22 RX ORDER — ISOSORBIDE MONONITRATE 30 MG/1
30 TABLET, EXTENDED RELEASE ORAL DAILY
Qty: 30 TAB | Refills: 6 | Status: SHIPPED | OUTPATIENT
Start: 2017-09-22

## 2017-09-22 NOTE — TELEPHONE ENCOUNTER
----- Message from Blaze Wisdom sent at 9/22/2017  3:18 PM EDT -----  Regarding: medication needs to go to different pharmacy   Contact: [phone]  isosorbide mononitrate ER, send to [facility]

## 2017-10-02 ENCOUNTER — OFFICE VISIT (OUTPATIENT)
Dept: CARDIOLOGY CLINIC | Age: 57
End: 2017-10-02

## 2017-10-02 ENCOUNTER — CLINICAL SUPPORT (OUTPATIENT)
Dept: CARDIOLOGY CLINIC | Age: 57
End: 2017-10-02

## 2017-10-02 VITALS
DIASTOLIC BLOOD PRESSURE: 71 MMHG | HEIGHT: 66 IN | BODY MASS INDEX: 18.8 KG/M2 | SYSTOLIC BLOOD PRESSURE: 135 MMHG | WEIGHT: 117 LBS

## 2017-10-02 DIAGNOSIS — Z87.891 PERSONAL HISTORY OF TOBACCO USE, PRESENTING HAZARDS TO HEALTH: ICD-10-CM

## 2017-10-02 DIAGNOSIS — E78.00 PURE HYPERCHOLESTEROLEMIA: ICD-10-CM

## 2017-10-02 DIAGNOSIS — I25.10 CORONARY ARTERY DISEASE INVOLVING NATIVE CORONARY ARTERY OF NATIVE HEART WITHOUT ANGINA PECTORIS: Primary | ICD-10-CM

## 2017-10-02 DIAGNOSIS — Z95.1 S/P CABG X 3: ICD-10-CM

## 2017-10-02 DIAGNOSIS — I10 ESSENTIAL HYPERTENSION, BENIGN: ICD-10-CM

## 2017-10-02 DIAGNOSIS — I20.8 ANGINA AT REST (HCC): ICD-10-CM

## 2017-10-02 DIAGNOSIS — I25.700 CORONARY ARTERY DISEASE INVOLVING CORONARY BYPASS GRAFT OF NATIVE HEART WITH UNSTABLE ANGINA PECTORIS (HCC): Primary | ICD-10-CM

## 2017-10-02 RX ORDER — BUPROPION HYDROCHLORIDE 150 MG/1
TABLET, EXTENDED RELEASE ORAL 2 TIMES DAILY
COMMUNITY

## 2017-10-02 RX ORDER — AMLODIPINE BESYLATE 2.5 MG/1
2.5 TABLET ORAL DAILY
Qty: 30 TAB | Refills: 3 | Status: SHIPPED | OUTPATIENT
Start: 2017-10-02 | End: 2017-10-10

## 2017-10-02 RX ORDER — PANTOPRAZOLE SODIUM 40 MG/1
40 TABLET, DELAYED RELEASE ORAL DAILY
COMMUNITY

## 2017-10-02 RX ORDER — POLYETHYLENE GLYCOL 3350 17 G/17G
17 POWDER, FOR SOLUTION ORAL DAILY
COMMUNITY

## 2017-10-02 RX ORDER — NITROGLYCERIN 0.4 MG/1
0.4 TABLET SUBLINGUAL
Qty: 25 TAB | Refills: 0 | Status: SHIPPED | OUTPATIENT
Start: 2017-10-02

## 2017-10-02 NOTE — PROGRESS NOTES
HISTORY OF PRESENT ILLNESS  Mili Booth is a 56 y.o. female. HPI Comments: 10/17 seen post stress which is high risk with large ant ischemia. Gets b/l calf claudication about 1/2 mile    Has cad,post cabg-6/2017,htypelipidemia    Recovering post cabg,has occasional sharp pains-clinically non angina  Recent admission with atypical chest pains  Now c/o pain after eating with nausea and vomiting,also has weight loss    Hypertension   The history is provided by the patient. This is a chronic problem. The problem occurs constantly. The problem has not changed since onset. Associated symptoms include chest pain and shortness of breath. Pertinent negatives include no abdominal pain and no headaches. Chest Pain (Angina)    The history is provided by the patient. This is a recurrent problem. The current episode started more than 1 week ago. Duration of episode(s) is 30 minutes. The problem occurs daily. The pain is associated with eating food. The pain is present in the substernal region. The pain is moderate. The quality of the pain is described as sharp. The pain does not radiate. Associated symptoms include shortness of breath. Pertinent negatives include no abdominal pain, no claudication, no cough, no diaphoresis, no dizziness, no fever, no headaches, no hemoptysis, no nausea, no orthopnea, no palpitations, no PND, no sputum production, no vomiting and no weakness. Procedural history includes cardiac catheterization. Review of Systems   Constitutional: Negative for chills, diaphoresis and fever. HENT: Negative for nosebleeds. Eyes: Negative for blurred vision and double vision. Respiratory: Positive for shortness of breath. Negative for cough, hemoptysis, sputum production and wheezing. Cardiovascular: Positive for chest pain. Negative for palpitations, orthopnea, claudication, leg swelling and PND. Gastrointestinal: Negative for abdominal pain, heartburn, nausea and vomiting.    Musculoskeletal: Negative for myalgias. Skin: Negative for rash. Neurological: Negative for dizziness, weakness and headaches. Endo/Heme/Allergies: Does not bruise/bleed easily. Family History   Problem Relation Age of Onset    Heart Disease Other     Heart Surgery Other     Heart Attack Other        Past Medical History:   Diagnosis Date    Essential hypertension, benign     Hypercholesterolemia     Hypertension     Other and unspecified hyperlipidemia     Personal history of tobacco use, presenting hazards to health     Discussed cessation    Shortness of breath     Possible asthma Mild MR, no clinical fluid overload       Past Surgical History:   Procedure Laterality Date    CARDIAC CATHETERIZATION  6/12/2017         CORONARY ARTERY ANGIOGRAM  6/12/2017         HX ORTHOPAEDIC      foot surgery    LV ANGIOGRAPHY  6/12/2017         MODERATE SEDATION  6/12/2017            No Known Allergies    Current Outpatient Prescriptions   Medication Sig    regadenoson (LEXISCAN) 0.4 mg/5 mL syrg injection 5 mL by IntraVENous route once for 1 dose.  buPROPion SR (WELLBUTRIN SR) 150 mg SR tablet Take  by mouth two (2) times a day.  pantoprazole (PROTONIX) 40 mg tablet Take 40 mg by mouth daily.  polyethylene glycol (MIRALAX) 17 gram packet Take 17 g by mouth daily.  isosorbide mononitrate ER (IMDUR) 30 mg tablet Take 1 Tab by mouth daily.  tiotropium bromide (SPIRIVA RESPIMAT) 1.25 mcg/actuation inhaler Take 2 Puffs by inhalation daily.  clopidogrel (PLAVIX) 75 mg tab Take  by mouth.  aspirin delayed-release 81 mg tablet Take  by mouth daily.  rosuvastatin (CRESTOR) 10 mg tablet Take 10 mg by mouth nightly.  carvedilol (COREG) 6.25 mg tablet Take  by mouth two (2) times daily (with meals).  ferrous sulfate 325 mg (65 mg iron) tablet Take  by mouth Daily (before breakfast).  sennosides (SENNA) 8.6 mg cap Take  by mouth.     albuterol (PROVENTIL HFA, VENTOLIN HFA) 90 mcg/actuation inhaler Take 1 Puff by inhalation every four (4) hours as needed. No current facility-administered medications for this visit. Visit Vitals    /71    Ht 5' 6\" (1.676 m)    Wt 53.1 kg (117 lb)    BMI 18.88 kg/m2         Physical Exam   Constitutional: She is oriented to person, place, and time. She appears well-developed and well-nourished. HENT:   Head: Normocephalic and atraumatic. Eyes: Conjunctivae are normal.   Neck: Neck supple. No JVD present. No tracheal deviation present. No thyromegaly present. Cardiovascular: Normal rate and regular rhythm. PMI is not displaced. Exam reveals no gallop, no S3 and no decreased pulses. Murmur heard. Holosystolic murmur is present with a grade of 2/6  at the apex  Pulmonary/Chest: No respiratory distress. She has no wheezes. She has no rales. She exhibits no tenderness. Abdominal: Soft. There is no tenderness. Musculoskeletal: She exhibits no edema. Neurological: She is alert and oriented to person, place, and time. Skin: Skin is warm. Psychiatric: She has a normal mood and affect. CARDIOLOGY STUDIES 10/1/2012   Myocardial Perfusion Scan Result probably normal   Echocardiogram - Complete Result normal EF, mild MR   Some recent data might be hidden   6/2017SUMMARY: Severe three-vessel CAD with excellent targets, and overall normal LV function. Would recommend consideration for coronary artery bypass graft surgery. Moderate sedation was utilized for 30 minutes using Versed and fentanyl under my supervision. SUMMARY:echo-6/2017  Left ventricle: Systolic function was at the lower limits of normal by EF  (biplane method of disks). Ejection fraction was estimated to be 50 %. There was of the basal inferolateral wall(s). Features were consistent  with a pseudonormal left ventricular filling pattern, with concomitant  abnormal relaxation and increased filling pressure (grade 2 diastolic  dysfunction).     Left atrium: The atrium was mildly dilated. Mitral valve: There was trivial regurgitation. Aortic valve: There was trivial regurgitation. Operation Performed on 6/22/2017  1.   Coronary artery bypass grafting x3 with a left internal mammary artery to the left anterior descending, and a double sequential saphenous vein graft to the diagonal and then more distally to the ramus intermedius coronary artery (the distal branches of the right coronary artery were not large enough for grafting in this chronically occluded vessel). · Right coronary: Mid 100% occlusion with distal filling via collaterals from the left coronary system. (not bypasses)      LIMITED ECHOCARDIOGRAM.8/2017   HYPOKINESIS OF THE BASAL INFEROSEPTAL WALL WITH PRESERVED LEFT VENTRICULAR SYSTOLIC   FUNCTION. EJECTION FRACTION IS VISUALLY ESTIMATED AT 60%. GRADE I (MILD) DIASTOLIC DYSFUNCTION. MILD HYPERTROPHY OF THE LEFT VENTRICULAR POSTERIOR WALL. REDUCED RIGHT VENTRICULAR GLOBAL SYSTOLIC FUNCTION. MILDLY SCLEROTIC TRILEAFLET AORTIC VALVE WITHOUT EVIDENCE OF REDUCED EXCURSION. MILD AORTIC REGURGITATION. PULMONARY ARTERY PRESSURE WAS NOT ASSESSED ON THIS LIMITED STUDY. TRIVIAL PULMONIC REGURGITATION. NO SIGNIFICANT CHANGES FROM PRIOR ECHO REPORT ON 6/16/2017. Assessment       Diagnoses and all orders for this visit:    1. Coronary artery disease involving coronary bypass graft of native heart with unstable angina pectoris [facility])  Comments:  10/17 atypical, CCS3, post meals  Orders:  -     amLODIPine (NORVASC) 2.5 mg tablet; Take 1 Tab by mouth daily. Indications: cad  -     CARDIAC CATHETERIZATION; Future  -     CBC W/O DIFF; Future  -     METABOLIC PANEL, BASIC; Future  -     PROTHROMBIN TIME + INR; Future  -     URINALYSIS W/ RFLX MICROSCOPIC; Future  -     XR CHEST PA LAT; Future  -     PTT; Future  -     nitroglycerin (NITROSTAT) 0.4 mg SL tablet; 1 Tab by SubLINGual route every five (5) minutes as needed for Chest Pain for up to 3 doses.     2. Essential hypertension, benign  Comments:  controlled      3. Personal history of tobacco use, presenting hazards to health  Comments:  Patient advised to stop smoking to reduce future cardiovascular events. Discussed cessation      4. Pure hypercholesterolemia  Comments:  1/17 [id number]      5. S/P CABG x 3      The benefits and risks of cardiac catheterization have been discussed in detail with the patient present at the time. Patient understands  risk of potential cath complications including but not limited to bleeding, infection, difficulty healing the arteriotomy access site(2 chances in 100) which may require surgical repair, potential thromboembolic complications which could result in stroke, myocardial infarction, loss of limb or organ function and/or death( 1 chance in 1000)and potential allergic reaction to contrast dye or other medication used during the procedure. Patient is also aware of the therapeutic implications for medical management vs coronary artery bypass surgery vs percutaneous coronary intervention in treatment of coronary artery disease. The additional risks for percutaneous coronary artery intervention include the need for emergent bypass surgery at 1 chance in 100 and for restenosis which may range from 35% for plain balloon angioplasty, 15% for bare metal stent, and 5% for drug eluting stent implants. The need for mandatory uninterrupted dual antiplatelet therapy with lifelong Aspirin combined with Plavix for up to 12 months following drug eluting stents, and minimum 1 month following bare metal stents to prevent stent thrombosis which is the equivalent of acute heart attack has been reviewed in detail. No contraindications to use of drug eluting stents has been discovered. Pertinent laboratory and test data reviewed and discussed with patient. See patient instructions also for other medical advice given    There are no discontinued medications.     Follow-up Disposition:  Return if symptoms worsen or fail to improve, for with Dr Yaniv Francis, post cath, post procedure, as scheduled.

## 2017-10-02 NOTE — PATIENT INSTRUCTIONS
There are no discontinued medications. Orders Placed This Encounter    XR CHEST PA LAT     Standing Status:   Future     Standing Expiration Date:   2018     Order Specific Question:   Reason for Exam     Answer:   cath, poss PCI     Order Specific Question:   Is Patient Allergic to Contrast Dye? Answer:   No    CBC W/O DIFF     Standing Status:   Future     Standing Expiration Date:       METABOLIC PANEL, BASIC     Standing Status:   Future     Standing Expiration Date:   10/3/2018    PROTHROMBIN TIME + INR     Standing Status:   Future     Standing Expiration Date:   2017    URINALYSIS W/ RFLX MICROSCOPIC     Standing Status:   Future     Standing Expiration Date:   2017    PTT     Standing Status:   Future     Standing Expiration Date:   10/3/2018    CARDIAC CATHETERIZATION     Standing Status:   Future     Standing Expiration Date:   2018     Order Specific Question:   Reason for Exam:     Answer:   [place] CCS3    amLODIPine (NORVASC) 2.5 mg tablet     Sig: Take 1 Tab by mouth daily. Indications: cad     Dispense:  30 Tab     Refill:  3    nitroglycerin (NITROSTAT) 0.4 mg SL tablet     Si Tab by SubLINGual route every five (5) minutes as needed for Chest Pain for up to 3 doses. Dispense:  25 Tab     Refill:  0          Stopping Smoking: Care Instructions  Your Care Instructions  Cigarette smokers crave the nicotine in cigarettes. Giving it up is much harder than simply changing a habit. Your body has to stop craving the nicotine. It is hard to quit, but you can do it. There are many tools that people use to quit smoking. You may find that combining tools works best for you. There are several steps to quitting. First you get ready to quit. Then you get support to help you. After that, you learn new skills and behaviors to become a nonsmoker. For many people, a necessary step is getting and using medicine.   Your doctor will help you set up the plan that best meets your needs. You may want to attend a smoking cessation program to help you quit smoking. When you choose a program, look for one that has proven success. Ask your doctor for ideas. You will greatly increase your chances of success if you take medicine as well as get counseling or join a cessation program.  Some of the changes you feel when you first quit tobacco are uncomfortable. Your body will miss the nicotine at first, and you may feel short-tempered and grumpy. You may have trouble sleeping or concentrating. Medicine can help you deal with these symptoms. You may struggle with changing your smoking habits and rituals. The last step is the tricky one: Be prepared for the smoking urge to continue for a time. This is a lot to deal with, but keep at it. You will feel better. Follow-up care is a key part of your treatment and safety. Be sure to make and go to all appointments, and call your doctor if you are having problems. Its also a good idea to know your test results and keep a list of the medicines you take. How can you care for yourself at home? · Ask your family, friends, and coworkers for support. You have a better chance of quitting if you have help and support. · Join a support group, such as Nicotine Anonymous, for people who are trying to quit smoking. · Consider signing up for a smoking cessation program, such as the American Lung Association's Freedom from Smoking program.  · Set a quit date. Pick your date carefully so that it is not right in the middle of a big deadline or stressful time. Once you quit, do not even take a puff. Get rid of all ashtrays and lighters after your last cigarette. Clean your house and your clothes so that they do not smell of smoke. · Learn how to be a nonsmoker. Think about ways you can avoid those things that make you reach for a cigarette. ¨ Avoid situations that put you at greatest risk for smoking.  For some people, it is hard to have a drink with friends without smoking. For others, they might skip a coffee break with coworkers who smoke. ¨ Change your daily routine. Take a different route to work or eat a meal in a different place. · Cut down on stress. Calm yourself or release tension by doing an activity you enjoy, such as reading a book, taking a hot bath, or gardening. · Talk to your doctor or pharmacist about nicotine replacement therapy, which replaces the nicotine in your body. You still get nicotine but you do not use tobacco. Nicotine replacement products help you slowly reduce the amount of nicotine you need. These products come in several forms, many of them available over-the-counter:  ¨ Nicotine patches  ¨ Nicotine gum and lozenges  ¨ Nicotine inhaler  · Ask your doctor about bupropion (Wellbutrin) or varenicline (Chantix), which are prescription medicines. They do not contain nicotine. They help you by reducing withdrawal symptoms, such as stress and anxiety. · Some people find hypnosis, acupuncture, and massage helpful for ending the smoking habit. · Eat a healthy diet and get regular exercise. Having healthy habits will help your body move past its craving for nicotine. · Be prepared to keep trying. Most people are not successful the first few times they try to quit. Do not get mad at yourself if you smoke again. Make a list of things you learned and think about when you want to try again, such as next week, next month, or next year. Where can you learn more? Go to http://alexa-judd.info/. Enter C632 in the search box to learn more about \"Stopping Smoking: Care Instructions. \"  Current as of: March 20, 2017  Content Version: 11.3  © 2836-4761 [employer]. Care instructions adapted under license by [employer] (which disclaims liability or warranty for this information).  If you have questions about a medical condition or this instruction, always ask your healthcare professional. [employer] disclaims any warranty or liability for your use of this information. Instructions    Patients Name:  Antonella Brandon    1. You are scheduled to have a Cath/PCI on 10/9/17  at [facility] Please check in at       . 2. Please go to [facility] and park in the outpatient parking lot that is located around to the back of the hospital and enter through the [facility] building. Once you enter through the [facility] check in with the  there. The  will either give you directions or assist you in getting to the cath holding area. 3. You are not to eat anything after midnight before the procedure. Please continue to drink fluids up until 12:00.  (water, juice, black coffee, soft drinks). Do not drink milk or milk products or anything with cream. You may take medications(except for diabetes) with a small sip of water before 6am on the day of the procedure. .    4. If you are diabetic, do not take your insulin/sugar pill the morning of the procedure. 5. MEDICATION INSTRUCTIONS:   Please take your morning medications with the following special instructions:    [x]          Please make sure to take your Blood pressure medication :  With just enough water to swallow. [x]          Take your Aspirin and/or Plavix. With just enough water to swallow. []          Stop your Coumadin on   and do not resume it until after the procedure.     []          Take Prednisone 60 mg and Benadryl 25 mg by mouth at Bedtime on  and again on  at . This is to prevent you from having an allergic reaction to the dye. 6. We encourage families to wait in the waiting room on the first floor while the procedure is being done. The Doctor will come out and talk with you as soon as the procedure is over. 7. There is the possibility that you may spend the night in the hospital, depending on the results of the procedure.   This will be determined after the procedure is done. If angioplasty or stent is planned, you will stay at least one day. 8. If you or your family have any questions, please call our office Monday -Friday, 9:00 a.m.-4:30 p.m.,  At [phone], and ask to speak to one of the nurses.

## 2017-10-02 NOTE — PROGRESS NOTES
[facility]  [address], [facility], [address] [place] [address], [place], [address] [place] [address]  [phone] [facility]  [phone] [place]       Name: Kashif Joshua         MRN#: [id number]        YOB: 1960   Gender: female Ht:5'6\" Wt:117 lbs       . Date of Rest/Stress Images: 10/2/2017   Referring Physician: Yanique Allen DO  Ordering Physician: Tom Ceja  Technologist: LIAM Santos, C.N.M.T  Diagnosis:No diagnosis found. Rest/Stress Myoview SPECT Myocardial Perfusion Imaging with  Lexiscan Stress and gated SPECT Imaging      PROCEDURE:      Myocardial perfusion imaging was performed at rest approximately 30 mins following the intravenous injection,(Right hand ) of 12.2 mCi of Tc99m Myoview for evaluation of myocardial function and perfusion at rest.      Baseline:   Heart rate 68. /90. EKG shows sinus rhythm, rightward axis, old anteroseptal MI. .  Nonspecific T-wave inversion in lead I and aVL. J-point elevation in inferior leads consistent with early repolarization    Procedure Note:        0.4 mg of Lexiscan given intravenously over 15-20 seconds during sitting exercises followed by Myoview injection. Heart rate increased to 109,  blood pressure increased to 164/94. No new ST-T changes are seen. Patient complained of chest pain for which IV Aminophyllin was given      Conculsion:   1. No ischemic ST-T changes after Lexiscan infusion. 2.  Appropriate heart rate and blood pressure response. 3.  No ischemic symptoms or arrhythmias seen. 4.  Perfusion images report to follow. Pharmacological:  Patient was injected with . 4 mg/mL with Lexiscan intravenously over a period 10 to 20 sec. After pharmacologic stress, the patient was injected intravenously with 38.0 mCi of Tc99m Myoview. Gating post stress tomographic imaging performed approximately 45 minutes post tracer injection.  The data was reconstructed in the short, horizontal long and vertical long axis views and tomographic slices were generated. NUCLEAR IMAGING:    Findings:  1. Stress images reveal moderate to severely reduced Myoview uptake in the entire anterior wall seen in short axis, vertical and horizontal long axis views. 2. Resting images have nearly complete redistribution in the anterior wall. 3. Gated images reveal normal wall motion and ejection fraction is calculated at 79%. Conclusion:  1. Abnormal perfusion scan. 2. Evidence of a fairly large reversible ischemia in the anterior wall indicating one vessel coronary artery disease in the LAD territory. 3. Normal wall motion and preserved ejection fraction at 79%. 4. This represents a high risk scan. Thank you for the referral.    E-signed and Interpreting Physician:    Cy Cardoso.  Dalila Sim MD, [facility]     Date of interpretation: 10/2/2017  Date of final report: 10/2/2017

## 2017-10-09 ENCOUNTER — HOSPITAL ENCOUNTER (OUTPATIENT)
Dept: CARDIAC CATH/INVASIVE PROCEDURES | Age: 57
Setting detail: OBSERVATION
Discharge: HOME | End: 2017-10-10
Attending: INTERNAL MEDICINE | Admitting: INTERNAL MEDICINE
Payer: MEDICARE

## 2017-10-09 PROBLEM — I25.729 CORONARY ARTERY DISEASE INVOLVING AUTOLOGOUS ARTERY CORONARY BYPASS GRAFT WITH ANGINA PECTORIS (HCC): Status: ACTIVE | Noted: 2017-10-09

## 2017-10-09 PROBLEM — Z95.5 HISTORY OF CORONARY ARTERY STENT PLACEMENT: Status: ACTIVE | Noted: 2017-10-09

## 2017-10-09 LAB
ACT BLD: 235 SECS (ref 79–138)
ANION GAP SERPL CALC-SCNC: 5 MMOL/L (ref 3–18)
BASOPHILS # BLD: 0 K/UL (ref 0–0.06)
BASOPHILS NFR BLD: 0 % (ref 0–2)
BUN SERPL-MCNC: 16 MG/DL (ref 7–18)
BUN/CREAT SERPL: 22 (ref 12–20)
CALCIUM SERPL-MCNC: 9.4 MG/DL (ref 8.5–10.1)
CHLORIDE SERPL-SCNC: 103 MMOL/L (ref 100–108)
CO2 SERPL-SCNC: 31 MMOL/L (ref 21–32)
CREAT SERPL-MCNC: 0.72 MG/DL (ref 0.6–1.3)
DIFFERENTIAL METHOD BLD: ABNORMAL
EOSINOPHIL # BLD: 0.4 K/UL (ref 0–0.4)
EOSINOPHIL NFR BLD: 6 % (ref 0–5)
ERYTHROCYTE [DISTWIDTH] IN BLOOD BY AUTOMATED COUNT: 14.7 % (ref 11.6–14.5)
GLUCOSE SERPL-MCNC: 76 MG/DL (ref 74–99)
HCT VFR BLD AUTO: 39.9 % (ref 35–45)
HGB BLD-MCNC: 13.3 G/DL (ref 12–16)
INR PPP: 1 (ref 0.8–1.2)
LYMPHOCYTES # BLD: 3.1 K/UL (ref 0.9–3.6)
LYMPHOCYTES NFR BLD: 42 % (ref 21–52)
MCH RBC QN AUTO: 30.3 PG (ref 24–34)
MCHC RBC AUTO-ENTMCNC: 33.3 G/DL (ref 31–37)
MCV RBC AUTO: 90.9 FL (ref 74–97)
MONOCYTES # BLD: 0.5 K/UL (ref 0.05–1.2)
MONOCYTES NFR BLD: 7 % (ref 3–10)
NEUTS SEG # BLD: 3.3 K/UL (ref 1.8–8)
NEUTS SEG NFR BLD: 45 % (ref 40–73)
PLATELET # BLD AUTO: 208 K/UL (ref 135–420)
PMV BLD AUTO: 9.8 FL (ref 9.2–11.8)
POTASSIUM SERPL-SCNC: 4.2 MMOL/L (ref 3.5–5.5)
PROTHROMBIN TIME: 12.4 SEC (ref 11.5–15.2)
RBC # BLD AUTO: 4.39 M/UL (ref 4.2–5.3)
SODIUM SERPL-SCNC: 139 MMOL/L (ref 136–145)
WBC # BLD AUTO: 7.3 K/UL (ref 4.6–13.2)

## 2017-10-09 PROCEDURE — 77030004534 HC CATH ANGI DX INFN CARD -A

## 2017-10-09 PROCEDURE — 74011250636 HC RX REV CODE- 250/636

## 2017-10-09 PROCEDURE — 92937 PRQ TRLUML REVSC CAB GRF 1: CPT

## 2017-10-09 PROCEDURE — 80048 BASIC METABOLIC PNL TOTAL CA: CPT | Performed by: INTERNAL MEDICINE

## 2017-10-09 PROCEDURE — C1769 GUIDE WIRE: HCPCS

## 2017-10-09 PROCEDURE — 74011000250 HC RX REV CODE- 250: Performed by: INTERNAL MEDICINE

## 2017-10-09 PROCEDURE — 85610 PROTHROMBIN TIME: CPT | Performed by: INTERNAL MEDICINE

## 2017-10-09 PROCEDURE — 93005 ELECTROCARDIOGRAM TRACING: CPT

## 2017-10-09 PROCEDURE — C1725 CATH, TRANSLUMIN NON-LASER: HCPCS

## 2017-10-09 PROCEDURE — 74011250637 HC RX REV CODE- 250/637: Performed by: INTERNAL MEDICINE

## 2017-10-09 PROCEDURE — C1894 INTRO/SHEATH, NON-LASER: HCPCS

## 2017-10-09 PROCEDURE — 99218 HC RM OBSERVATION: CPT

## 2017-10-09 PROCEDURE — 74011250637 HC RX REV CODE- 250/637

## 2017-10-09 PROCEDURE — 85025 COMPLETE CBC W/AUTO DIFF WBC: CPT | Performed by: INTERNAL MEDICINE

## 2017-10-09 PROCEDURE — 99152 MOD SED SAME PHYS/QHP 5/>YRS: CPT

## 2017-10-09 PROCEDURE — 74011636320 HC RX REV CODE- 636/320: Performed by: INTERNAL MEDICINE

## 2017-10-09 PROCEDURE — 85347 COAGULATION TIME ACTIVATED: CPT

## 2017-10-09 PROCEDURE — 77030004535 HC CATH ANGI DX INFN J&J -B

## 2017-10-09 PROCEDURE — C1760 CLOSURE DEV, VASC: HCPCS

## 2017-10-09 PROCEDURE — 74011250636 HC RX REV CODE- 250/636: Performed by: INTERNAL MEDICINE

## 2017-10-09 PROCEDURE — 77030013797 HC KT TRNSDUC PRSSR EDWD -A

## 2017-10-09 PROCEDURE — C1874 STENT, COATED/COV W/DEL SYS: HCPCS

## 2017-10-09 PROCEDURE — 99153 MOD SED SAME PHYS/QHP EA: CPT

## 2017-10-09 PROCEDURE — C1887 CATHETER, GUIDING: HCPCS

## 2017-10-09 PROCEDURE — 93455 CORONARY ART/GRFT ANGIO S&I: CPT

## 2017-10-09 PROCEDURE — 77030028790 HC ACC ST CTRL VLV COPLT ABBT -B

## 2017-10-09 RX ORDER — VERAPAMIL HYDROCHLORIDE 2.5 MG/ML
2.5-5 INJECTION, SOLUTION INTRAVENOUS ONCE
Status: DISCONTINUED | OUTPATIENT
Start: 2017-10-09 | End: 2017-10-09 | Stop reason: HOSPADM

## 2017-10-09 RX ORDER — EPTIFIBATIDE 0.75 MG/ML
2 INJECTION, SOLUTION INTRAVENOUS CONTINUOUS
Status: DISCONTINUED | OUTPATIENT
Start: 2017-10-09 | End: 2017-10-09 | Stop reason: HOSPADM

## 2017-10-09 RX ORDER — HEPARIN SODIUM 200 [USP'U]/100ML
500 INJECTION, SOLUTION INTRAVENOUS ONCE
Status: COMPLETED | OUTPATIENT
Start: 2017-10-09 | End: 2017-10-09

## 2017-10-09 RX ORDER — SODIUM CHLORIDE 0.9 % (FLUSH) 0.9 %
5-10 SYRINGE (ML) INJECTION AS NEEDED
Status: DISCONTINUED | OUTPATIENT
Start: 2017-10-09 | End: 2017-10-10 | Stop reason: HOSPADM

## 2017-10-09 RX ORDER — SODIUM CHLORIDE 0.9 % (FLUSH) 0.9 %
5-10 SYRINGE (ML) INJECTION EVERY 8 HOURS
Status: DISCONTINUED | OUTPATIENT
Start: 2017-10-09 | End: 2017-10-10 | Stop reason: HOSPADM

## 2017-10-09 RX ORDER — POLYETHYLENE GLYCOL 3350 17 G/17G
17 POWDER, FOR SOLUTION ORAL DAILY
Status: DISCONTINUED | OUTPATIENT
Start: 2017-10-10 | End: 2017-10-10 | Stop reason: HOSPADM

## 2017-10-09 RX ORDER — ISOSORBIDE MONONITRATE 30 MG/1
30 TABLET, EXTENDED RELEASE ORAL DAILY
Status: DISCONTINUED | OUTPATIENT
Start: 2017-10-10 | End: 2017-10-10 | Stop reason: HOSPADM

## 2017-10-09 RX ORDER — EPTIFIBATIDE 2 MG/ML
180 INJECTION, SOLUTION INTRAVENOUS ONCE
Status: COMPLETED | OUTPATIENT
Start: 2017-10-09 | End: 2017-10-09

## 2017-10-09 RX ORDER — CLOPIDOGREL 300 MG/1
300 TABLET, FILM COATED ORAL ONCE
Status: DISCONTINUED | OUTPATIENT
Start: 2017-10-09 | End: 2017-10-09

## 2017-10-09 RX ORDER — MIDAZOLAM HYDROCHLORIDE 1 MG/ML
.5-2 INJECTION, SOLUTION INTRAMUSCULAR; INTRAVENOUS
Status: DISCONTINUED | OUTPATIENT
Start: 2017-10-09 | End: 2017-10-09 | Stop reason: HOSPADM

## 2017-10-09 RX ORDER — CARVEDILOL 6.25 MG/1
6.25 TABLET ORAL 2 TIMES DAILY WITH MEALS
Status: DISCONTINUED | OUTPATIENT
Start: 2017-10-09 | End: 2017-10-10 | Stop reason: HOSPADM

## 2017-10-09 RX ORDER — ONDANSETRON 2 MG/ML
4 INJECTION INTRAMUSCULAR; INTRAVENOUS
Status: DISCONTINUED | OUTPATIENT
Start: 2017-10-09 | End: 2017-10-10 | Stop reason: HOSPADM

## 2017-10-09 RX ORDER — TEMAZEPAM 15 MG/1
15 CAPSULE ORAL
Status: DISCONTINUED | OUTPATIENT
Start: 2017-10-09 | End: 2017-10-10 | Stop reason: HOSPADM

## 2017-10-09 RX ORDER — NITROGLYCERIN 0.4 MG/1
0.4 TABLET SUBLINGUAL
Status: DISCONTINUED | OUTPATIENT
Start: 2017-10-09 | End: 2017-10-10 | Stop reason: HOSPADM

## 2017-10-09 RX ORDER — FENTANYL CITRATE 50 UG/ML
12.5-1 INJECTION, SOLUTION INTRAMUSCULAR; INTRAVENOUS
Status: DISCONTINUED | OUTPATIENT
Start: 2017-10-09 | End: 2017-10-09 | Stop reason: HOSPADM

## 2017-10-09 RX ORDER — NITROGLYCERIN 400 UG/1
SPRAY ORAL
Status: COMPLETED
Start: 2017-10-09 | End: 2017-10-09

## 2017-10-09 RX ORDER — HYDROMORPHONE HYDROCHLORIDE 1 MG/ML
0.5 INJECTION, SOLUTION INTRAMUSCULAR; INTRAVENOUS; SUBCUTANEOUS
Status: DISCONTINUED | OUTPATIENT
Start: 2017-10-09 | End: 2017-10-10 | Stop reason: HOSPADM

## 2017-10-09 RX ORDER — PANTOPRAZOLE SODIUM 40 MG/1
40 TABLET, DELAYED RELEASE ORAL
Status: DISCONTINUED | OUTPATIENT
Start: 2017-10-10 | End: 2017-10-10 | Stop reason: HOSPADM

## 2017-10-09 RX ORDER — AMLODIPINE BESYLATE 5 MG/1
5 TABLET ORAL DAILY
Status: DISCONTINUED | OUTPATIENT
Start: 2017-10-10 | End: 2017-10-10 | Stop reason: HOSPADM

## 2017-10-09 RX ORDER — ROSUVASTATIN CALCIUM 10 MG/1
10 TABLET, COATED ORAL
Status: DISCONTINUED | OUTPATIENT
Start: 2017-10-09 | End: 2017-10-10 | Stop reason: HOSPADM

## 2017-10-09 RX ORDER — HEPARIN SODIUM 1000 [USP'U]/ML
10000 INJECTION, SOLUTION INTRAVENOUS; SUBCUTANEOUS ONCE
Status: COMPLETED | OUTPATIENT
Start: 2017-10-09 | End: 2017-10-09

## 2017-10-09 RX ORDER — LIDOCAINE HYDROCHLORIDE 10 MG/ML
1-30 INJECTION, SOLUTION EPIDURAL; INFILTRATION; INTRACAUDAL; PERINEURAL
Status: DISCONTINUED | OUTPATIENT
Start: 2017-10-09 | End: 2017-10-09 | Stop reason: HOSPADM

## 2017-10-09 RX ORDER — ACETAMINOPHEN 325 MG/1
650 TABLET ORAL
Status: DISCONTINUED | OUTPATIENT
Start: 2017-10-09 | End: 2017-10-10 | Stop reason: HOSPADM

## 2017-10-09 RX ORDER — SODIUM CHLORIDE 9 MG/ML
100 INJECTION, SOLUTION INTRAVENOUS CONTINUOUS
Status: DISPENSED | OUTPATIENT
Start: 2017-10-09 | End: 2017-10-10

## 2017-10-09 RX ORDER — SODIUM CHLORIDE 9 MG/ML
100 INJECTION, SOLUTION INTRAVENOUS CONTINUOUS
Status: DISCONTINUED | OUTPATIENT
Start: 2017-10-09 | End: 2017-10-09

## 2017-10-09 RX ORDER — ASPIRIN 81 MG/1
81 TABLET ORAL DAILY
Status: DISCONTINUED | OUTPATIENT
Start: 2017-10-10 | End: 2017-10-10 | Stop reason: HOSPADM

## 2017-10-09 RX ORDER — BUPROPION HYDROCHLORIDE 150 MG/1
150 TABLET, EXTENDED RELEASE ORAL 2 TIMES DAILY
Status: DISCONTINUED | OUTPATIENT
Start: 2017-10-09 | End: 2017-10-10 | Stop reason: HOSPADM

## 2017-10-09 RX ORDER — ATROPINE SULFATE 0.4 MG/ML
0.5 INJECTION, SOLUTION ENDOTRACHEAL; INTRAMEDULLARY; INTRAMUSCULAR; INTRAVENOUS; SUBCUTANEOUS AS NEEDED
Status: DISCONTINUED | OUTPATIENT
Start: 2017-10-09 | End: 2017-10-10 | Stop reason: HOSPADM

## 2017-10-09 RX ORDER — EPTIFIBATIDE 2 MG/ML
INJECTION, SOLUTION INTRAVENOUS
Status: COMPLETED
Start: 2017-10-09 | End: 2017-10-09

## 2017-10-09 RX ORDER — HYDRALAZINE HYDROCHLORIDE 20 MG/ML
20 INJECTION INTRAMUSCULAR; INTRAVENOUS ONCE
Status: COMPLETED | OUTPATIENT
Start: 2017-10-09 | End: 2017-10-09

## 2017-10-09 RX ORDER — NITROGLYCERIN 400 UG/1
1 SPRAY ORAL
Status: DISCONTINUED | OUTPATIENT
Start: 2017-10-09 | End: 2017-10-09

## 2017-10-09 RX ORDER — LANOLIN ALCOHOL/MO/W.PET/CERES
1 CREAM (GRAM) TOPICAL
Status: DISCONTINUED | OUTPATIENT
Start: 2017-10-10 | End: 2017-10-10 | Stop reason: HOSPADM

## 2017-10-09 RX ORDER — ALBUTEROL SULFATE 0.83 MG/ML
2.5 SOLUTION RESPIRATORY (INHALATION)
Status: DISCONTINUED | OUTPATIENT
Start: 2017-10-09 | End: 2017-10-10 | Stop reason: HOSPADM

## 2017-10-09 RX ORDER — ALBUTEROL SULFATE 90 UG/1
1 AEROSOL, METERED RESPIRATORY (INHALATION)
Status: DISCONTINUED | OUTPATIENT
Start: 2017-10-09 | End: 2017-10-09 | Stop reason: CLARIF

## 2017-10-09 RX ORDER — HYDRALAZINE HYDROCHLORIDE 20 MG/ML
INJECTION INTRAMUSCULAR; INTRAVENOUS
Status: COMPLETED
Start: 2017-10-09 | End: 2017-10-09

## 2017-10-09 RX ADMIN — Medication 10 ML: at 23:16

## 2017-10-09 RX ADMIN — MIDAZOLAM HYDROCHLORIDE 1 MG: 1 INJECTION, SOLUTION INTRAMUSCULAR; INTRAVENOUS at 14:47

## 2017-10-09 RX ADMIN — HEPARIN SODIUM 4000 UNITS: 1000 INJECTION, SOLUTION INTRAVENOUS; SUBCUTANEOUS at 15:13

## 2017-10-09 RX ADMIN — ROSUVASTATIN CALCIUM 10 MG: 10 TABLET ORAL at 23:16

## 2017-10-09 RX ADMIN — HYDROMORPHONE HYDROCHLORIDE 0.5 MG: 1 INJECTION, SOLUTION INTRAMUSCULAR; INTRAVENOUS; SUBCUTANEOUS at 18:02

## 2017-10-09 RX ADMIN — SODIUM CHLORIDE 100 ML/HR: 900 INJECTION, SOLUTION INTRAVENOUS at 12:53

## 2017-10-09 RX ADMIN — HYDRALAZINE HYDROCHLORIDE 10 MG: 20 INJECTION INTRAMUSCULAR; INTRAVENOUS at 16:16

## 2017-10-09 RX ADMIN — SODIUM CHLORIDE 100 ML/HR: 900 INJECTION, SOLUTION INTRAVENOUS at 16:35

## 2017-10-09 RX ADMIN — MIDAZOLAM HYDROCHLORIDE 1 MG: 1 INJECTION, SOLUTION INTRAMUSCULAR; INTRAVENOUS at 14:45

## 2017-10-09 RX ADMIN — NITROGLYCERIN 1 SPRAY: 400 SPRAY ORAL at 15:05

## 2017-10-09 RX ADMIN — FENTANYL CITRATE 25 MCG: 50 INJECTION INTRAMUSCULAR; INTRAVENOUS at 14:45

## 2017-10-09 RX ADMIN — CARVEDILOL 6.25 MG: 6.25 TABLET, FILM COATED ORAL at 16:56

## 2017-10-09 RX ADMIN — TICAGRELOR 180 MG: 90 TABLET ORAL at 16:11

## 2017-10-09 RX ADMIN — NITROGLYCERIN 1 SPRAY: 400 SPRAY, METERED SUBLINGUAL at 15:05

## 2017-10-09 RX ADMIN — BUPROPION HYDROCHLORIDE 150 MG: 150 TABLET, FILM COATED, EXTENDED RELEASE ORAL at 23:16

## 2017-10-09 RX ADMIN — EPTIFIBATIDE 9.56 MG: 2 INJECTION, SOLUTION INTRAVENOUS at 15:23

## 2017-10-09 RX ADMIN — EPTIFIBATIDE 9.56 MG: 2 INJECTION, SOLUTION INTRAVENOUS at 15:33

## 2017-10-09 RX ADMIN — HEPARIN SODIUM 1000 UNITS: 200 INJECTION, SOLUTION INTRAVENOUS at 14:46

## 2017-10-09 RX ADMIN — IOPAMIDOL 170 ML: 612 INJECTION, SOLUTION INTRAVENOUS at 16:08

## 2017-10-09 RX ADMIN — LIDOCAINE HYDROCHLORIDE 7 ML: 10 INJECTION, SOLUTION EPIDURAL; INFILTRATION; INTRACAUDAL; PERINEURAL at 14:50

## 2017-10-09 RX ADMIN — Medication 10 ML: at 16:44

## 2017-10-09 NOTE — PROCEDURES
[facility] CATH LAB    Name:  Oretha Kawasaki  MR#:  [id number]  :  1960  Account #:  [de-identified]  Date of Adm:  10/09/2017  Date of Service:  10/09/2017      ESTIMATED BLOOD LOSS: 30cc    SPECIMENS REMOVED: none    PROCEDURE PERFORMED: Cardiac catheterization and coronary  intervention. INDICATIONS: Unstable angina, post-recent CABG x3, CCS class 3,  and abnormal stress test.    PROCEDURE NOTE: The patient was brought from home as  outpatient. She was taken to catheterization laboratory, draped and  prepared in the usual aseptic precautions. Right femoral artery was  cannulated without any complications. Initial bilateral coronary SVG  and LIMA angiography was done using 4-Georgian catheters. After this,  the sheath was changed to 6-Georgian catheters through which the  coronary intervention was done of the 95% stenosis of the sequential  SVG graft at the ostium and will be described below. At the end, all the  catheters were removed and a 6-Georgian Angio-Seal was deployed. No  complications noted. HEMODYNAMICS: Aortic pressure 160-180/90 and was stable during  the procedure. Moderate sedation was done using IV Versed and IV fentanyl. The  patient was monitored under the supervision throughout the procedure. The total time was 73 minutes. Total contrast used was 170 mL and total radiation was 1112 mGy. ANGIOGRAPHIC FINDINGS ARE AS FOLLOWS  1. Left main is normal angiographically. 2. LAD arising from left main. It is a long vessel and gives rise to  multiple septal perforators. There is a competitive flow seen in the  proximal LAD after the origin of the 2 septal perforators. I do not see  any significant diagonal branches off the LAD in this patient. 3. Ramus intermedius is not visualized. There is a very acute angle in  the proximal LAD which may have been called the ramus as it may  have been a high diagonal artery.   4. Left circumflex artery arising from left main. It gives rise to a small to  moderate OM1, a moderate-sized OM2 and distally the circumflex  terminates into 2 small posterolateral branches. Left circumflex and its  branches have luminal irregularities without any critical disease. 5. Right coronary artery arises from right coronary ostium. It is a  dominant vessel and is totally occluded in the mid segment. The right  ventricular branch is large and has proximal 90% stenosis. The distal  right coronary and PDA is seen by well-developed left-to-right  collaterals. 6. Sequential SVG to diagonal and ramus intermedius artery has 95%  ostial stenosis. There was dampening of the pressure as the 4-Yakut  catheter was positioned here and the patient experienced chest pain  as well. The flow into the graft body of the graft is complete. The first  touchdown to the diagonal artery seems to be a very small vessel and  is thread. The graft continues to a large ramus intermedius artery with 2  acute bends and at the distal touchdown with the ramus intermedius,  there appears to be a 70-80% stenosis which in certain angles appears  to be towards 70%. The flow is complete. 7. LIMA to LAD is patent at proximal and distal anastomosis as well as  the body of the graft. In a retrograde fashion, this graft supplies a small  diagonal artery. In antegrade fashion, there are multiple septal  perforators. The artery turns around the apex and supplies the  inferoapical segment. It appears that this artery is likely codominant  with the right coronary artery in this patient. Through the septal  , there were well-developed collaterals to the right coronary  artery. CORONARY INTERVENTION NOTE: Using a JR4 guide with side  holes, 0.014 run-through guidewire, the guide was positioned at the  ostium. The wire was advanced through the graft and turned distally  into the ramus intermedius artery. I did not use any filter wire as the  graft is only 3 months old.  We used IV heparin and IV Integrilin as  anticoagulation in this patient. It was difficult to position the wire in the  main ramus intermedius artery as it kept going in a small branch of this  and manipulation at so distal level was difficult. We took a 2.5 x 12 mm  Emerge balloon and advanced it over the wire, but this balloon would  not even cross the second distal anastomosis lesion because of  extreme tortuosity in the distal graft and since the distal graft appeared  borderline in certain frames, I did not do the PCI at this point. Instead, the same balloon was withdrawn back and multiple inflations  were done at the ostium followed by elective stenting using a 3.0 x 12  mm Synergy drug-eluting stent. Post-dilation was done using a 3.25 x  12 mm balloon. This balloon was also inflated proximally with half of it  protruding into the aorta to flare the ostium of the stent. Excellent  angiographic result was obtained without any residual stenosis. A 3.47  mm in diameter was achieved with 16 atmospheres inflation. CONCLUSION  1. Native 3-vessel coronary artery disease with critical proximal LAD  lesion, totally occluded ramus intermedius artery and totally occluded  mid right coronary artery with good left-to-right collaterals to the right  PDA. 2. 95% ostial stenosis of the sequential saphenous venous graft to  diagonal and ramus intermedius artery. The diagonal touchdown is  patent, but the diagonal artery itself is very thready and extremely  small at less than 1 mm in diameter, as it appears on angiogram today. There is a severe bend with distal anastomosis of this sequential graft  and there appears to be a 70% stenosis at the distal anastomosis as  well, which in certain views appeared to be less than critical and as it  was difficult to negotiate the balloon over this bend, I did not do any  PCI of this area.   3. Successful intervention of the ostium of the sequential SVG  reducing an initial 95% stenosis to a residual 0% stenosis deploying a  drug-eluting stent. 4. Patent left internal mammary artery to left anterior descending. DISCUSSION AND RECOMMENDATIONS: Aggressive risk factor  modification and continued medical treatment is recommended. Tobacco cessation has been discussed many times and will be  reinforced.         MD MARIO Lr / Veronica Session  D:  10/09/2017   16:22  T:  10/09/2017   17:29  Job #:  [id number]

## 2017-10-09 NOTE — BRIEF OP NOTE
Cardiac Cathetherization Note:    PreOpDiagnosis: [place], CCS3    Anesthesia used : Moderate Sedation: Used IV fentanyl and IV versed. Details of doses are as documented in nursing flow sheet. Patient continuously monitored during the procedure. Total time was  73 minutes. Findings/PostOp Diagnosis:  1. Native 3 VD, 95% ostial and 70% distal 2nd touchdown with RI; 1st touchdown with diag patent but native vessel is very thready. 2. Patent LIMA to LAD  3. MARGA at ostium of SVG with 0% residual TIMI3 flow    Plan:  1. Continued med Rx    Closure: 6F RFA angioseal  Estimated Blood Loss: Minimal  Specimens: None  Assistants: Per [facility]  Complications: None    Other coronary anatomy and procedural details as per full dictated note. #[id number]    Yoan Sarah MD, MD  10/9/2017, 4:10 PM

## 2017-10-09 NOTE — ROUTINE PROCESS
1815: Received report on Lore Haifazal and assumed care. Pt arrived via stretcher w/ RN. Pt alert&oriented x4. /99 HR 78 RR 23 SpO2 100% on RA. Lungs clear. Post cath site assessment: clean, dry, intact; no bleeding noted; no pain reported. Pulses palpable. Pt educated on complete  bedrest for 6 hours. Continue monitor. Site and VS assessments documented in flow sheet. 1900: Bedside and Verbal shift change report given to Davy Montgomery (oncoming nurse) by Tiffanie Cortez   (offgoing nurse). Report included the following information SBAR, Kardex, ED Summary, OR Summary and Procedure Summary.

## 2017-10-09 NOTE — H&P
Pt seen and examined, chart reviewed prior to procedure  No changes since last office visit. See office visit from 10/2/2017    Office Visit     10/2/2017  [facility] Logan Ham MD   Cardiology    Coronary artery disease involving coronary bypass graft of native heart with unstable angina pectoris ([facility] [address] +4 more   Dx    Shortness of Breath , Hypertension   Reason for visit    Progress Notes      HISTORY OF PRESENT ILLNESS  Mili Booth is a 56 y.o. female.     HPI Comments: 10/17 seen post stress which is high risk with large ant ischemia. Gets b/l calf claudication about 1/2 mile     Has cad,post cabg-6/2017,htypelipidemia     Recovering post cabg,has occasional sharp pains-clinically non angina  Recent admission with atypical chest pains  Now c/o pain after eating with nausea and vomiting,also has weight loss     Hypertension   The history is provided by the patient. This is a chronic problem. The problem occurs constantly. The problem has not changed since onset. Associated symptoms include chest pain and shortness of breath. Pertinent negatives include no abdominal pain and no headaches. Chest Pain (Angina)    The history is provided by the patient. This is a recurrent problem. The current episode started more than 1 week ago. Duration of episode(s) is 30 minutes. The problem occurs daily. The pain is associated with eating food. The pain is present in the substernal region. The pain is moderate. The quality of the pain is described as sharp. The pain does not radiate. Associated symptoms include shortness of breath. Pertinent negatives include no abdominal pain, no claudication, no cough, no diaphoresis, no dizziness, no fever, no headaches, no hemoptysis, no nausea, no orthopnea, no palpitations, no PND, no sputum production, no vomiting and no weakness.  Procedural history includes cardiac catheterization.        Review of Systems   Constitutional: Negative for chills, diaphoresis and fever. HENT: Negative for nosebleeds. Eyes: Negative for blurred vision and double vision. Respiratory: Positive for shortness of breath. Negative for cough, hemoptysis, sputum production and wheezing. Cardiovascular: Positive for chest pain. Negative for palpitations, orthopnea, claudication, leg swelling and PND. Gastrointestinal: Negative for abdominal pain, heartburn, nausea and vomiting. Musculoskeletal: Negative for myalgias. Skin: Negative for rash. Neurological: Negative for dizziness, weakness and headaches. Endo/Heme/Allergies: Does not bruise/bleed easily.            Family History   Problem Relation Age of Onset    Heart Disease Other      Heart Surgery Other      Heart Attack Other                Past Medical History:   Diagnosis Date    Essential hypertension, benign      Hypercholesterolemia      Hypertension      Other and unspecified hyperlipidemia      Personal history of tobacco use, presenting hazards to health       Discussed cessation    Shortness of breath       Possible asthma Mild MR, no clinical fluid overload               Past Surgical History:   Procedure Laterality Date    CARDIAC CATHETERIZATION   6/12/2017           CORONARY ARTERY ANGIOGRAM   6/12/2017           HX ORTHOPAEDIC         foot surgery    LV ANGIOGRAPHY   6/12/2017           MODERATE SEDATION   6/12/2017                No Known Allergies          Current Outpatient Prescriptions   Medication Sig    regadenoson (LEXISCAN) 0.4 mg/5 mL syrg injection 5 mL by IntraVENous route once for 1 dose.  buPROPion SR (WELLBUTRIN SR) 150 mg SR tablet Take  by mouth two (2) times a day.  pantoprazole (PROTONIX) 40 mg tablet Take 40 mg by mouth daily.  polyethylene glycol (MIRALAX) 17 gram packet Take 17 g by mouth daily.  isosorbide mononitrate ER (IMDUR) 30 mg tablet Take 1 Tab by mouth daily.     tiotropium bromide (SPIRIVA RESPIMAT) 1.25 mcg/actuation inhaler Take 2 Puffs by inhalation daily.  clopidogrel (PLAVIX) 75 mg tab Take  by mouth.  aspirin delayed-release 81 mg tablet Take  by mouth daily.  rosuvastatin (CRESTOR) 10 mg tablet Take 10 mg by mouth nightly.  carvedilol (COREG) 6.25 mg tablet Take  by mouth two (2) times daily (with meals).  ferrous sulfate 325 mg (65 mg iron) tablet Take  by mouth Daily (before breakfast).  sennosides (SENNA) 8.6 mg cap Take  by mouth.  albuterol (PROVENTIL HFA, VENTOLIN HFA) 90 mcg/actuation inhaler Take 1 Puff by inhalation every four (4) hours as needed.      No current facility-administered medications for this visit.               Visit Vitals    /71    Ht 5' 6\" (1.676 m)    Wt 53.1 kg (117 lb)    BMI 18.88 kg/m2            Physical Exam   Constitutional: She is oriented to person, place, and time. She appears well-developed and well-nourished. HENT:   Head: Normocephalic and atraumatic. Eyes: Conjunctivae are normal.   Neck: Neck supple. No JVD present. No tracheal deviation present. No thyromegaly present. Cardiovascular: Normal rate and regular rhythm. PMI is not displaced. Exam reveals no gallop, no S3 and no decreased pulses. Murmur heard. Holosystolic murmur is present with a grade of 2/6  at the apex  Pulmonary/Chest: No respiratory distress. She has no wheezes. She has no rales. She exhibits no tenderness. Abdominal: Soft. There is no tenderness. Musculoskeletal: She exhibits no edema. Neurological: She is alert and oriented to person, place, and time. Skin: Skin is warm.    Psychiatric: She has a normal mood and affect.         CARDIOLOGY STUDIES 10/1/2012   Myocardial Perfusion Scan Result probably normal   Echocardiogram - Complete Result normal EF, mild MR   Some recent data might be hidden   6/2017SUMMARY: Severe three-vessel CAD with excellent targets, and overall normal LV function.  Would recommend consideration for coronary artery bypass graft surgery.     Moderate sedation was utilized for 30 minutes using Versed and fentanyl under my supervision. SUMMARY:echo-6/2017  Left ventricle: Systolic function was at the lower limits of normal by EF  (biplane method of disks). Ejection fraction was estimated to be 50 %. There was of the basal inferolateral wall(s). Features were consistent  with a pseudonormal left ventricular filling pattern, with concomitant  abnormal relaxation and increased filling pressure (grade 2 diastolic  dysfunction). Left atrium: The atrium was mildly dilated. Mitral valve: There was trivial regurgitation. Aortic valve: There was trivial regurgitation.        Operation Performed on 6/22/2017  1.   Coronary artery bypass grafting x3 with a left internal mammary artery to the left anterior descending, and a double sequential saphenous vein graft to the diagonal and then more distally to the ramus intermedius coronary artery (the distal branches of the right coronary artery were not large enough for grafting in this chronically occluded vessel). · Right coronary: Mid 100% occlusion with distal filling via collaterals from the left coronary system. (not bypasses)        LIMITED ECHOCARDIOGRAM.8/2017   HYPOKINESIS OF THE BASAL INFEROSEPTAL WALL WITH PRESERVED LEFT VENTRICULAR SYSTOLIC   FUNCTION. EJECTION FRACTION IS VISUALLY ESTIMATED AT 60%. GRADE I (MILD) DIASTOLIC DYSFUNCTION. MILD HYPERTROPHY OF THE LEFT VENTRICULAR POSTERIOR WALL. REDUCED RIGHT VENTRICULAR GLOBAL SYSTOLIC FUNCTION. MILDLY SCLEROTIC TRILEAFLET AORTIC VALVE WITHOUT EVIDENCE OF REDUCED EXCURSION. MILD AORTIC REGURGITATION. PULMONARY ARTERY PRESSURE WAS NOT ASSESSED ON THIS LIMITED STUDY. TRIVIAL PULMONIC REGURGITATION. NO SIGNIFICANT CHANGES FROM PRIOR ECHO REPORT ON 6/16/2017.     Assessment         Diagnoses and all orders for this visit:     1.  Coronary artery disease involving coronary bypass graft of native heart with unstable angina pectoris [facility])  Comments:  10/17 atypical, CCS3, post meals  Orders:  -     amLODIPine (NORVASC) 2.5 mg tablet; Take 1 Tab by mouth daily. Indications: cad  -     CARDIAC CATHETERIZATION; Future  -     CBC W/O DIFF; Future  -     METABOLIC PANEL, BASIC; Future  -     PROTHROMBIN TIME + INR; Future  -     URINALYSIS W/ RFLX MICROSCOPIC; Future  -     XR CHEST PA LAT; Future  -     PTT; Future  -     nitroglycerin (NITROSTAT) 0.4 mg SL tablet; 1 Tab by SubLINGual route every five (5) minutes as needed for Chest Pain for up to 3 doses.     2. Essential hypertension, benign  Comments:  controlled        3. Personal history of tobacco use, presenting hazards to health  Comments:  Patient advised to stop smoking to reduce future cardiovascular events. Discussed cessation        4. Pure hypercholesterolemia  Comments:  1/17 [id number]        5. S/P CABG x 3        The benefits and risks of cardiac catheterization have been discussed in detail with the patient present at the time. Patient understands  risk of potential cath complications including but not limited to bleeding, infection, difficulty healing the arteriotomy access site(2 chances in 100) which may require surgical repair, potential thromboembolic complications which could result in stroke, myocardial infarction, loss of limb or organ function and/or death( 1 chance in 1000)and potential allergic reaction to contrast dye or other medication used during the procedure. Patient is also aware of the therapeutic implications for medical management vs coronary artery bypass surgery vs percutaneous coronary intervention in treatment of coronary artery disease. The additional risks for percutaneous coronary artery intervention include the need for emergent bypass surgery at 1 chance in 100 and for restenosis which may range from 35% for plain balloon angioplasty, 15% for bare metal stent, and 5% for drug eluting stent implants.  The need for mandatory uninterrupted dual antiplatelet therapy with lifelong Aspirin combined with Plavix for up to 12 months following drug eluting stents, and minimum 1 month following bare metal stents to prevent stent thrombosis which is the equivalent of acute heart attack has been reviewed in detail. No contraindications to use of drug eluting stents has been discovered.        Pertinent laboratory and test data reviewed and discussed with patient.   See patient instructions also for other medical advice given     There are no discontinued medications.     Follow-up Disposition:  Return if symptoms worsen or fail to improve, for with Dr Lorie Gilford, post cath, post procedure, as scheduled.

## 2017-10-09 NOTE — IP AVS SNAPSHOT
[address] Patient: Bettina Postin MRN: [id number] TWK:[id number] You are allergic to the following No active allergies Recent Documentation Height Weight Breastfeeding? BMI OB Status Smoking Status 1.676 m 54.1 kg No 19.26 kg/m2 Postmenopausal Former Smoker Emergency Contacts Name Discharge Info Relation Home Work Mobile Dickson Quinones DISCHARGE CAREGIVER [3] Other Relative [6] [phone] About your hospitalization You were admitted on:  October 9, 2017 You last received care in the:  [facility] [address] You were discharged on:  October 10, 2017 Unit phone number:  [phone] Why you were hospitalized Your primary diagnosis was:  Coronary Artery Disease Involving Autologous Artery Coronary Bypass Graft With Angina Pectoris (Hcc) Your diagnoses also included:  S/P Cabg X 3, Pure Hypercholesterolemia, Essential Hypertension, Benign, History Of Coronary Artery Stent Placement Providers Seen During Your Hospitalizations Provider Role Specialty Primary office phone Letty Domingo MD Attending Provider Cardiology [phone] Your Primary Care Physician (PCP) Primary Care Physician Office Phone Office Fax Lillie Segal [phone] Follow-up Information Follow up With Details Comments Contact Info Sheryl Pisano, 2 [facility] [address] 200 [facility] [address] 
[phone] Your Appointments Thursday October 26, 2017 10:45 AM EDT Follow Up with Wilner Webster MD  
[facility] [address] [facility]  
[phone] Current Discharge Medication List  
  
START taking these medications Dose & Instructions Dispensing Information Comments Morning Noon Evening Bedtime ticagrelor 90 mg tablet Commonly known as:  [employer] Your last dose was: Your next dose is:    
   
   
 Dose:  90 mg Take 1 Tab by mouth two (2) times a day. Quantity:  60 Tab Refills:  0 CONTINUE these medications which have CHANGED Dose & Instructions Dispensing Information Comments Morning Noon Evening Bedtime  
 amLODIPine 5 mg tablet Commonly known as:  Josh Wilde What changed:   
- medication strength 
- how much to take Your last dose was: Your next dose is:    
   
   
 Dose:  5 mg Take 1 Tab by mouth daily. Indications: cad Quantity:  30 Tab Refills:  6 CONTINUE these medications which have NOT CHANGED Dose & Instructions Dispensing Information Comments Morning Noon Evening Bedtime  
 albuterol 90 mcg/actuation inhaler Commonly known as:  PROVENTIL HFA, VENTOLIN HFA, PROAIR HFA Your last dose was: Your next dose is:    
   
   
 Dose:  1 Puff Take 1 Puff by inhalation every four (4) hours as needed. Refills:  0  
     
   
   
   
  
 aspirin delayed-release 81 mg tablet Your last dose was: Your next dose is: Take  by mouth daily. Refills:  0  
     
   
   
   
  
 buPROPion  mg SR tablet Commonly known as:  Enid Abreu Your last dose was: Your next dose is: Take  by mouth two (2) times a day. Refills:  0  
     
   
   
   
  
 carvedilol 6.25 mg tablet Commonly known as:  Christine Salvadore Your last dose was: Your next dose is: Take  by mouth two (2) times daily (with meals). Refills:  0  
     
   
   
   
  
 CRESTOR 10 mg tablet Generic drug:  rosuvastatin Your last dose was: Your next dose is:    
   
   
 Dose:  10 mg Take 10 mg by mouth nightly. Refills:  0  
     
   
   
   
  
 ferrous sulfate 325 mg (65 mg iron) tablet Your last dose was: Your next dose is: Take  by mouth Daily (before breakfast). Refills:  0  
     
   
   
   
  
 isosorbide mononitrate ER 30 mg tablet Commonly known as:  IMDUR Your last dose was: Your next dose is:    
   
   
 Dose:  30 mg Take 1 Tab by mouth daily. Quantity:  30 Tab Refills:  6  
     
   
   
   
  
 nitroglycerin 0.4 mg SL tablet Commonly known as:  NITROSTAT Your last dose was: Your next dose is:    
   
   
 Dose:  0.4 mg  
1 Tab by SubLINGual route every five (5) minutes as needed for Chest Pain for up to 3 doses. Quantity:  25 Tab Refills:  0  
     
   
   
   
  
 pantoprazole 40 mg tablet Commonly known as:  PROTONIX Your last dose was: Your next dose is:    
   
   
 Dose:  40 mg Take 40 mg by mouth daily. Refills:  0  
     
   
   
   
  
 polyethylene glycol 17 gram packet Commonly known as:  Nickolas Sport Your last dose was: Your next dose is:    
   
   
 Dose:  17 g Take 17 g by mouth daily. Refills:  0 SPIRIVA RESPIMAT 1.25 mcg/actuation inhaler Generic drug:  tiotropium bromide Your last dose was: Your next dose is:    
   
   
 Dose:  2 Puff Take 2 Puffs by inhalation daily. Refills:  0 STOP taking these medications   
 clopidogrel 75 mg Tab Commonly known as:  PLAVIX Senna 8.6 mg Cap Generic drug:  sennosides Where to Get Your Medications Information on where to get these meds will be given to you by the nurse or doctor. ! Ask your nurse or doctor about these medications  
  amLODIPine 5 mg tablet  
 ticagrelor 90 mg tablet Discharge Instructions Patient armband removed and shredded DISCHARGE SUMMARY from Nurse The following personal items are in your possession at time of discharge: 
 
Dental Appliances: None Visual Aid: None Home Medications: None Jewelry: None Clothing: None Other Valuables: None PATIENT INSTRUCTIONS: 
 
 
F-face looks uneven A-arms unable to move or move unevenly S-speech slurred or non-existent T-time-call 911 as soon as signs and symptoms begin-DO NOT go Back to bed or wait to see if you get better-TIME IS BRAIN. Warning Signs of HEART ATTACK Call 911 if you have these symptoms: 
? Chest discomfort. Most heart attacks involve discomfort in the center of the chest that lasts more than a few minutes, or that goes away and comes back. It can feel like uncomfortable pressure, squeezing, fullness, or pain. ? Discomfort in other areas of the upper body. Symptoms can include pain or discomfort in one or both arms, the back, neck, jaw, or stomach. ? Shortness of breath with or without chest discomfort. ? Other signs may include breaking out in a cold sweat, nausea, or lightheadedness. Don't wait more than five minutes to call [address]! Fast action can save your life. Calling 911 is almost always the fastest way to get lifesaving treatment. Emergency Medical Services staff can begin treatment when they arrive  up to an hour sooner than if someone gets to the hospital by car. The discharge information has been reviewed with the patient. The patient verbalized understanding. Discharge medications reviewed with the patient and appropriate educational materials and side effects teaching were provided. Discharge Orders None [facility] Announcement We are excited to announce that we are making your provider's discharge notes available to you in [employer]. You will see these notes when they are completed and signed by the physician that discharged you from your recent hospital stay.   If you have any questions or concerns about any information you see in [employer], please call the Health Information Department where you were seen or reach out to your Primary Care Provider for more information about your plan of care. Introducing [facility]! Kellie Pandey introduces [employer] patient portal. Now you can access parts of your medical record, email your doctor's office, and request medication refills online. 1. In your internet browser, go to [email or web address] 2. Click on the First Time User? Click Here link in the Sign In box. You will see the New Member Sign Up page. 3. Enter your [employer] Access Code exactly as it appears below. You will not need to use this code after youve completed the sign-up process. If you do not sign up before the expiration date, you must request a new code. · [employer] Access Code: [email or web address] Expires: 12/18/2017 12:37 PM 
 
4. Enter the last four digits of your Social Security Number (xxxx) and Date of Birth (mm/dd/yyyy) as indicated and click Submit. You will be taken to the next sign-up page. 5. Create a [employer] ID. This will be your [employer] login ID and cannot be changed, so think of one that is secure and easy to remember. 6. Create a [employer] password. You can change your password at any time. 7. Enter your Password Reset Question and Answer. This can be used at a later time if you forget your password. 8. Enter your e-mail address. You will receive e-mail notification when new information is available in [address] 9. Click Sign Up. You can now view and download portions of your medical record. 10. Click the Download Summary menu link to download a portable copy of your medical information. If you have questions, please visit the Frequently Asked Questions section of the [employer] website. Remember, [employer] is NOT to be used for urgent needs. For medical emergencies, dial 911. Now available from your iPhone and Android! General Information Please provide this summary of care documentation to your next provider. Patient Signature:  ____________________________________________________________ Date:  ____________________________________________________________  
  
Mackenzie Raymond Provider Signature:  ____________________________________________________________ Date:  ____________________________________________________________

## 2017-10-09 NOTE — IP AVS SNAPSHOT
Alonsougo Talley 
 
 
 [address] Patient: Clarence Oseguera MRN: [id number] TPC:8/15/1426 Current Discharge Medication List  
  
START taking these medications Dose & Instructions Dispensing Information Comments Morning Noon Evening Bedtime  
 ticagrelor 90 mg tablet Commonly known as:  [place] [employer] Your last dose was: Your next dose is:    
   
   
 Dose:  90 mg Take 1 Tab by mouth two (2) times a day. Quantity:  60 Tab Refills:  0 CONTINUE these medications which have CHANGED Dose & Instructions Dispensing Information Comments Morning Noon Evening Bedtime  
 amLODIPine 5 mg tablet Commonly known as:  Tad Gerardo What changed:   
- medication strength 
- how much to take Your last dose was: Your next dose is:    
   
   
 Dose:  5 mg Take 1 Tab by mouth daily. Indications: cad Quantity:  30 Tab Refills:  6 CONTINUE these medications which have NOT CHANGED Dose & Instructions Dispensing Information Comments Morning Noon Evening Bedtime  
 albuterol 90 mcg/actuation inhaler Commonly known as:  PROVENTIL HFA, VENTOLIN HFA, PROAIR HFA Your last dose was: Your next dose is:    
   
   
 Dose:  1 Puff Take 1 Puff by inhalation every four (4) hours as needed. Refills:  0  
     
   
   
   
  
 aspirin delayed-release 81 mg tablet Your last dose was: Your next dose is: Take  by mouth daily. Refills:  0  
     
   
   
   
  
 buPROPion  mg SR tablet Commonly known as:  Jose Chaudhari Your last dose was: Your next dose is: Take  by mouth two (2) times a day. Refills:  0  
     
   
   
   
  
 carvedilol 6.25 mg tablet Commonly known as:  Skyla Aguilar Your last dose was: Your next dose is: Take  by mouth two (2) times daily (with meals). Refills:  0  
     
   
   
   
  
 CRESTOR 10 mg tablet Generic drug:  rosuvastatin Your last dose was: Your next dose is:    
   
   
 Dose:  10 mg Take 10 mg by mouth nightly. Refills:  0  
     
   
   
   
  
 ferrous sulfate 325 mg (65 mg iron) tablet Your last dose was: Your next dose is: Take  by mouth Daily (before breakfast). Refills:  0  
     
   
   
   
  
 isosorbide mononitrate ER 30 mg tablet Commonly known as:  IMDUR Your last dose was: Your next dose is:    
   
   
 Dose:  30 mg Take 1 Tab by mouth daily. Quantity:  30 Tab Refills:  6  
     
   
   
   
  
 nitroglycerin 0.4 mg SL tablet Commonly known as:  NITROSTAT Your last dose was: Your next dose is:    
   
   
 Dose:  0.4 mg  
1 Tab by SubLINGual route every five (5) minutes as needed for Chest Pain for up to 3 doses. Quantity:  25 Tab Refills:  0  
     
   
   
   
  
 pantoprazole 40 mg tablet Commonly known as:  PROTONIX Your last dose was: Your next dose is:    
   
   
 Dose:  40 mg Take 40 mg by mouth daily. Refills:  0  
     
   
   
   
  
 polyethylene glycol 17 gram packet Commonly known as:  Hugo Lambert Your last dose was: Your next dose is:    
   
   
 Dose:  17 g Take 17 g by mouth daily. Refills:  0 SPIRIVA RESPIMAT 1.25 mcg/actuation inhaler Generic drug:  tiotropium bromide Your last dose was: Your next dose is:    
   
   
 Dose:  2 Puff Take 2 Puffs by inhalation daily. Refills:  0 STOP taking these medications   
 clopidogrel 75 mg Tab Commonly known as:  PLAVIX Senna 8.6 mg Cap Generic drug:  sennosides Where to Get Your Medications Information on where to get these meds will be given to you by the nurse or doctor. ! Ask your nurse or doctor about these medications  
  amLODIPine 5 mg tablet  
 ticagrelor 90 mg tablet

## 2017-10-09 NOTE — ROUTINE PROCESS
TRANSFER - OUT REPORT:    Verbal report given to ROMINA Adrian (name) on Yan Caballero  being transferred to MICU, 302(unit) for routine progression of care       Report consisted of patients Situation, Background, Assessment and   Recommendations(SBAR). Information from the following report(s) SBAR, Procedure Summary, Intake/Output, MAR and Recent Results was reviewed with the receiving nurse. Lines:   Peripheral IV 06/10/17 Left Antecubital (Active)       Peripheral IV 06/10/17 Left Wrist (Active)       Peripheral IV 10/09/17 Right Wrist (Active)       Peripheral IV 10/09/17 Left Antecubital (Active)        Opportunity for questions and clarification was provided.       Patient transported with:   Registered Nurse

## 2017-10-10 ENCOUNTER — TELEPHONE (OUTPATIENT)
Dept: CARDIAC REHAB | Age: 57
End: 2017-10-10

## 2017-10-10 VITALS
OXYGEN SATURATION: 100 % | TEMPERATURE: 98.4 F | DIASTOLIC BLOOD PRESSURE: 71 MMHG | WEIGHT: 119.3 LBS | HEIGHT: 66 IN | HEART RATE: 83 BPM | RESPIRATION RATE: 12 BRPM | SYSTOLIC BLOOD PRESSURE: 133 MMHG | BODY MASS INDEX: 19.17 KG/M2

## 2017-10-10 PROCEDURE — 99218 HC RM OBSERVATION: CPT

## 2017-10-10 PROCEDURE — 74011250637 HC RX REV CODE- 250/637: Performed by: INTERNAL MEDICINE

## 2017-10-10 PROCEDURE — 93005 ELECTROCARDIOGRAM TRACING: CPT

## 2017-10-10 RX ORDER — AMLODIPINE BESYLATE 5 MG/1
5 TABLET ORAL DAILY
Qty: 30 TAB | Refills: 6 | Status: SHIPPED | OUTPATIENT
Start: 2017-10-10

## 2017-10-10 RX ADMIN — Medication 325 MG: at 08:27

## 2017-10-10 RX ADMIN — AMLODIPINE BESYLATE 5 MG: 5 TABLET ORAL at 08:26

## 2017-10-10 RX ADMIN — CARVEDILOL 6.25 MG: 6.25 TABLET, FILM COATED ORAL at 08:24

## 2017-10-10 RX ADMIN — POLYETHYLENE GLYCOL 3350 17 G: 17 POWDER, FOR SOLUTION ORAL at 08:27

## 2017-10-10 RX ADMIN — BUPROPION HYDROCHLORIDE 150 MG: 150 TABLET, FILM COATED, EXTENDED RELEASE ORAL at 08:25

## 2017-10-10 RX ADMIN — ISOSORBIDE MONONITRATE 30 MG: 30 TABLET, EXTENDED RELEASE ORAL at 08:26

## 2017-10-10 RX ADMIN — ASPIRIN 81 MG: 81 TABLET ORAL at 08:26

## 2017-10-10 RX ADMIN — TICAGRELOR 90 MG: 90 TABLET ORAL at 03:45

## 2017-10-10 RX ADMIN — TIOTROPIUM BROMIDE 18 MCG: 18 CAPSULE ORAL; RESPIRATORY (INHALATION) at 09:00

## 2017-10-10 RX ADMIN — PANTOPRAZOLE SODIUM 40 MG: 40 TABLET, DELAYED RELEASE ORAL at 08:27

## 2017-10-10 RX ADMIN — Medication 10 ML: at 06:00

## 2017-10-10 NOTE — DISCHARGE INSTRUCTIONS
Patient armband removed and shredded  DISCHARGE SUMMARY from Nurse    The following personal items are in your possession at time of discharge:    Dental Appliances: None  Visual Aid: None     Home Medications: None  Jewelry: None  Clothing: None  Other Valuables: None             PATIENT INSTRUCTIONS:    After general anesthesia or intravenous sedation, for 24 hours or while taking prescription Narcotics:  · Limit your activities  · Do not drive and operate hazardous machinery  · Do not make important personal or business decisions  · Do  not drink alcoholic beverages  · If you have not urinated within 8 hours after discharge, please contact your surgeon on call. Report the following to your surgeon:  · Excessive pain, swelling, redness or odor of or around the surgical area  · Temperature over 100.5  · Nausea and vomiting lasting longer than 4 hours or if unable to take medications  · Any signs of decreased circulation or nerve impairment to extremity: change in color, persistent  numbness, tingling, coldness or increase pain  · Any questions        What to do at Home:  Recommended activity: Activity as tolerated          *  Please give a list of your current medications to your Primary Care Provider. *  Please update this list whenever your medications are discontinued, doses are      changed, or new medications (including over-the-counter products) are added. *  Please carry medication information at all times in case of emergency situations. These are general instructions for a healthy lifestyle:    No smoking/ No tobacco products/ Avoid exposure to second hand smoke    Surgeon General's Warning:  Quitting smoking now greatly reduces serious risk to your health.     Obesity, smoking, and sedentary lifestyle greatly increases your risk for illness    A healthy diet, regular physical exercise & weight monitoring are important for maintaining a healthy lifestyle    You may be retaining fluid if you have a history of heart failure or if you experience any of the following symptoms:  Weight gain of 3 pounds or more overnight or 5 pounds in a week, increased swelling in our hands or feet or shortness of breath while lying flat in bed. Please call your doctor as soon as you notice any of these symptoms; do not wait until your next office visit. Recognize signs and symptoms of STROKE:    F-face looks uneven    A-arms unable to move or move unevenly    S-speech slurred or non-existent    T-time-call 911 as soon as signs and symptoms begin-DO NOT go       Back to bed or wait to see if you get better-TIME IS BRAIN. Warning Signs of HEART ATTACK     Call 911 if you have these symptoms:   Chest discomfort. Most heart attacks involve discomfort in the center of the chest that lasts more than a few minutes, or that goes away and comes back. It can feel like uncomfortable pressure, squeezing, fullness, or pain.  Discomfort in other areas of the upper body. Symptoms can include pain or discomfort in one or both arms, the back, neck, jaw, or stomach.  Shortness of breath with or without chest discomfort.  Other signs may include breaking out in a cold sweat, nausea, or lightheadedness. Don't wait more than five minutes to call 911 - MINUTES MATTER! Fast action can save your life. Calling 911 is almost always the fastest way to get lifesaving treatment. Emergency Medical Services staff can begin treatment when they arrive -- up to an hour sooner than if someone gets to the hospital by car. The discharge information has been reviewed with the patient. The patient verbalized understanding. Discharge medications reviewed with the patient and appropriate educational materials and side effects teaching were provided.

## 2017-10-10 NOTE — DISCHARGE SUMMARY
Discharge Summary     Patient: Bruce Cardenas MRN: [id number]  SSN: [id number]    YOB: 1960  Age: 56 y.o.   Sex: female       Admit Date: 10/9/2017    Discharge Date: 10/10/2017      Admission Diagnoses: Atherosclerosis of coronary artery bypass graft(s), unspecified, with unstable angina pectoris [I25.700]    Discharge Diagnoses:   Problem List as of 10/10/2017  Date Reviewed: 10/9/2017          Codes Class Noted - Resolved    * (Principal)Coronary artery disease involving autologous artery coronary bypass graft with angina pectoris ([facility] [address] ICD-10-CM: I25.729  ICD-9-CM: 414.04, 413.9  10/9/2017 - Present    Overview Signed 10/9/2017  2:24 PM by Brody Persaud MD     10/17 CCS3, abnl stress test             History of coronary artery stent placement ICD-10-CM: Z95.5  ICD-9-CM: V45.82  10/9/2017 - Present        Coronary artery disease involving native coronary artery of native heart without angina pectoris ICD-10-CM: I25.10  ICD-9-CM: 414.01  7/5/2017 - Present    Overview Signed 7/5/2017 11:17 AM by Franklin Hooper MD     stable  post cath             S/P CABG x 3 ICD-10-CM: Z95.1  ICD-9-CM: V45.81  7/5/2017 - Present    Overview Signed 7/5/2017 11:17 AM by Franklin Hooper MD     Operation Performed on 6/22/2017  1.   Coronary artery bypass grafting x3 with a left internal mammary artery to the left anterior descending, and a double sequ             Angina at rest [facility]) ICD-10-CM: I20.8  ICD-9-CM: 413.9  6/10/2017 - Present        Essential hypertension, benign ICD-10-CM: I10  ICD-9-CM: 401.1  Unknown - Present    Overview Signed 10/2/2017 11:51 AM by Brody Persaud MD     controlled             Pure hypercholesterolemia ICD-10-CM: E78.00  ICD-9-CM: 272.0  Unknown - Present    Overview Signed 10/2/2017 11:53 AM by Brody Persaud MD     1/17 [id number]             Personal history of tobacco use, presenting hazards to health ICD-10-CM: Z87.891  ICD-9-CM: V15.82  Unknown - Present    Overview Addendum 10/2/2017 11:51 AM by Vazquez Cazares MD     Patient advised to stop smoking to reduce future cardiovascular events. Discussed cessation             Shortness of breath ICD-10-CM: R06.02  ICD-9-CM: 786.05  Unknown - Present    Overview Signed 4/1/2013  8:49 PM by Irving Nearing     Possible asthma  Mild MR, no clinical fluid overload                    Discharge Condition: Stable    Hospital Course: uneventful     Consults: None    Significant Diagnostic Studies: labs: bmp and CBC. Patient underwent Cardiac cath 10/09/17 that showed Native 3-vessel coronary artery disease with critical proximal LAD lesion, totally occluded ramus intermedius artery and totally occluded mid right coronary artery with good left-to-right collaterals to the right PDA. 95% ostial stenosis of the sequential saphenous venous graft to diagonal and ramus intermedius artery. Successful intervention of the ostium of the sequential SVGreducing an initial 95% stenosis to a residual 0% stenosis deploying a drug-eluting stent. Patent left internal mammary artery to left anterior descending. continue with aggressive risk factor modification Tobacco cessation has been discussed many times and will be reinforced    Disposition: home    Discharge Medications:   Current Discharge Medication List      START taking these medications    Details   ticagrelor (BRILINTA) 90 mg tablet Take 1 Tab by mouth two (2) times a day. Qty: 60 Tab, Refills: 0         CONTINUE these medications which have CHANGED    Details   amLODIPine (NORVASC) 5 mg tablet Take 1 Tab by mouth daily. Indications: cad  Qty: 30 Tab, Refills: 6         CONTINUE these medications which have NOT CHANGED    Details   buPROPion SR (WELLBUTRIN SR) 150 mg SR tablet Take  by mouth two (2) times a day. isosorbide mononitrate ER (IMDUR) 30 mg tablet Take 1 Tab by mouth daily.   Qty: 30 Tab, Refills: 6    Associated Diagnoses: Atherosclerosis of native coronary artery of native heart without angina pectoris      tiotropium bromide (SPIRIVA RESPIMAT) 1.25 mcg/actuation inhaler Take 2 Puffs by inhalation daily. aspirin delayed-release 81 mg tablet Take  by mouth daily. rosuvastatin (CRESTOR) 10 mg tablet Take 10 mg by mouth nightly. carvedilol (COREG) 6.25 mg tablet Take  by mouth two (2) times daily (with meals). ferrous sulfate 325 mg (65 mg iron) tablet Take  by mouth Daily (before breakfast). pantoprazole (PROTONIX) 40 mg tablet Take 40 mg by mouth daily. polyethylene glycol (MIRALAX) 17 gram packet Take 17 g by mouth daily. nitroglycerin (NITROSTAT) 0.4 mg SL tablet 1 Tab by SubLINGual route every five (5) minutes as needed for Chest Pain for up to 3 doses. Qty: 25 Tab, Refills: 0    Associated Diagnoses: Coronary artery disease involving coronary bypass graft of native heart with unstable angina pectoris (HCC)      albuterol (PROVENTIL HFA, VENTOLIN HFA) 90 mcg/actuation inhaler Take 1 Puff by inhalation every four (4) hours as needed. STOP taking these medications       clopidogrel (PLAVIX) 75 mg tab Comments:   Reason for Stopping:         sennosides (SENNA) 8.6 mg cap Comments:   Reason for Stopping:               Activity: Activity as tolerated and no driving for today  Diet: Cardiac Diet  Wound Care:  Do not lift above 5 pounds for 24 hours. Do not lift above 10 pounds for one week. DO NOT [address] DISHES or VACUUM for one week. . NO HOT TUBS, SWIMMING POOLS OR TUB  BATHS UNTIL PUNCTURE SITE IS COMPLETELY HEALED. Wash puncture site(s) with soap and water and keep clean and dry. Observe for signs of infection or if you have a fever of 100 degrees, and call Cardiologist office. If any pulsitile bleeding or large swelling under the skin, lie down, apply pressure and call 911. Remove clamp 30 mts post procedure. Chair rest for 1 hr. RADIAL ARTERY INSTRUCTIONS -- Do not lift above 5 pounds for 24 hours.  Do not lift above 10 pounds for one week.  DO NOT [address] DISHES or VACUUM for one week. . NO HOT TUBS, SWIMMING POOLS OR TUB  BATHS UNTIL PUNCTURE SITE IS COMPLETELY HEALED. Wash puncture site(s) with soap and water and keep clean and dry. Observe for signs of infection or if you have a fever of 100 degrees, and call Cardiologist office. If any pulsitile bleeding or large swelling under the skin, lie down, apply pressure and call 911. Follow-up Appointments   Procedures    FOLLOW UP VISIT Appointment in: One Month Dr. Junior Cárdenas 4 weeks     Dr. Junior Cárdenas 4 weeks     Standing Status:   Standing     Number of Occurrences:   1     Order Specific Question:   Appointment in     Answer:   One Month       Signed By: Kasie Treadwell NP     October 10, 2017    Patient seen independently  Discussed the details with NP and patient.  Please see orders & recommendations  Christiano Cooper MD

## 2017-10-10 NOTE — TELEPHONE ENCOUNTER
Cardiac rehab by to see patient. She had been discharged. Patient called and discussed relaxation, exercise and nutrition. We also discussed the outpatient cardiac rehab program. Explained that they would be calling to discuss it further. Will mail educational information to patient as well.

## 2017-10-11 LAB
ATRIAL RATE: 81 BPM
ATRIAL RATE: 83 BPM
CALCULATED P AXIS, ECG09: 68 DEGREES
CALCULATED P AXIS, ECG09: 78 DEGREES
CALCULATED R AXIS, ECG10: 76 DEGREES
CALCULATED R AXIS, ECG10: 87 DEGREES
CALCULATED T AXIS, ECG11: 90 DEGREES
CALCULATED T AXIS, ECG11: 97 DEGREES
DIAGNOSIS, 93000: NORMAL
DIAGNOSIS, 93000: NORMAL
P-R INTERVAL, ECG05: 182 MS
P-R INTERVAL, ECG05: 196 MS
Q-T INTERVAL, ECG07: 394 MS
Q-T INTERVAL, ECG07: 404 MS
QRS DURATION, ECG06: 94 MS
QRS DURATION, ECG06: 94 MS
QTC CALCULATION (BEZET), ECG08: 462 MS
QTC CALCULATION (BEZET), ECG08: 469 MS
VENTRICULAR RATE, ECG03: 81 BPM
VENTRICULAR RATE, ECG03: 83 BPM

## 2017-10-20 ENCOUNTER — TELEPHONE (OUTPATIENT)
Dept: CARDIAC REHAB | Age: 57
End: 2017-10-20

## 2017-10-20 NOTE — TELEPHONE ENCOUNTER
Cardiac Rehab called patient and spoke to her about the program. She is not interested and declined services.      Thank you,  Mp Bertrand

## 2017-11-08 ENCOUNTER — TELEPHONE (OUTPATIENT)
Dept: CARDIOLOGY CLINIC | Age: 57
End: 2017-11-08

## 2017-11-08 NOTE — TELEPHONE ENCOUNTER
Patient stated she is chest pain and shortness of breath. Pt stated she just got out the hospital oct 27. Went back to the hospital on the 5th. She stated they did not find anything. I asked did she want an appointment, she stated she do not have transportation. She stated she is still feeling bad. Please advise.

## 2017-11-09 NOTE — TELEPHONE ENCOUNTER
Patient called to review orders from Dr. Livier Chicas. Patient is not at home and unavailable. Family member stated that she would give her a message to return call.

## 2017-11-10 NOTE — TELEPHONE ENCOUNTER
Phone number not in service, unable to reach patient and she has not called back from message left with family member.

## 2017-12-20 DIAGNOSIS — I25.119 CORONARY ARTERY DISEASE INVOLVING NATIVE HEART WITH ANGINA PECTORIS, UNSPECIFIED VESSEL OR LESION TYPE (HCC): Primary | ICD-10-CM
